# Patient Record
Sex: MALE | Race: WHITE | Employment: OTHER | URBAN - METROPOLITAN AREA
[De-identification: names, ages, dates, MRNs, and addresses within clinical notes are randomized per-mention and may not be internally consistent; named-entity substitution may affect disease eponyms.]

---

## 2017-01-09 ENCOUNTER — OFFICE VISIT (OUTPATIENT)
Dept: FAMILY MEDICINE CLINIC | Age: 42
End: 2017-01-09

## 2017-01-09 ENCOUNTER — TELEPHONE (OUTPATIENT)
Dept: FAMILY MEDICINE CLINIC | Age: 42
End: 2017-01-09

## 2017-01-09 VITALS
HEIGHT: 67 IN | TEMPERATURE: 98.2 F | RESPIRATION RATE: 14 BRPM | SYSTOLIC BLOOD PRESSURE: 132 MMHG | DIASTOLIC BLOOD PRESSURE: 84 MMHG | BODY MASS INDEX: 25.9 KG/M2 | HEART RATE: 74 BPM | WEIGHT: 165 LBS

## 2017-01-09 DIAGNOSIS — F31.12 BIPOLAR 1 DISORDER WITH MODERATE MANIA (HCC): ICD-10-CM

## 2017-01-09 DIAGNOSIS — F41.9 ANXIETY AND DEPRESSION: ICD-10-CM

## 2017-01-09 DIAGNOSIS — F32.A ANXIETY AND DEPRESSION: Primary | ICD-10-CM

## 2017-01-09 DIAGNOSIS — Z20.2 STD EXPOSURE: ICD-10-CM

## 2017-01-09 DIAGNOSIS — F32.A ANXIETY AND DEPRESSION: ICD-10-CM

## 2017-01-09 DIAGNOSIS — F41.9 ANXIETY AND DEPRESSION: Primary | ICD-10-CM

## 2017-01-09 LAB
ALBUMIN SERPL-MCNC: 4.6 G/DL (ref 3.9–4.9)
ALP BLD-CCNC: 69 U/L (ref 35–104)
ALT SERPL-CCNC: 29 U/L (ref 0–41)
ANION GAP SERPL CALCULATED.3IONS-SCNC: 12 MEQ/L (ref 7–13)
AST SERPL-CCNC: 32 U/L (ref 0–40)
BILIRUB SERPL-MCNC: 1.4 MG/DL (ref 0–1.2)
BUN BLDV-MCNC: 16 MG/DL (ref 6–20)
CALCIUM SERPL-MCNC: 9.7 MG/DL (ref 8.6–10.2)
CHLORIDE BLD-SCNC: 99 MEQ/L (ref 98–107)
CO2: 26 MEQ/L (ref 22–29)
CREAT SERPL-MCNC: 0.84 MG/DL (ref 0.7–1.2)
GFR AFRICAN AMERICAN: >60
GFR NON-AFRICAN AMERICAN: >60
GLOBULIN: 2.5 G/DL (ref 2.3–3.5)
GLUCOSE BLD-MCNC: 102 MG/DL (ref 74–109)
HAV IGM SER IA-ACNC: NORMAL
HCT VFR BLD CALC: 45.1 % (ref 42–52)
HEMOGLOBIN: 15.7 G/DL (ref 14–18)
HEPATITIS B CORE IGM ANTIBODY: NORMAL
HEPATITIS B SURFACE ANTIGEN INTERPRETATION: NORMAL
HEPATITIS C ANTIBODY INTERPRETATION: NORMAL
HEPATITIS INTERPRETATION:: NORMAL
MCH RBC QN AUTO: 32.1 PG (ref 27–31.3)
MCHC RBC AUTO-ENTMCNC: 34.8 % (ref 33–37)
MCV RBC AUTO: 92.1 FL (ref 80–100)
PDW BLD-RTO: 12.6 % (ref 11.5–14.5)
PLATELET # BLD: 210 K/UL (ref 130–400)
POTASSIUM SERPL-SCNC: 3.9 MEQ/L (ref 3.5–5.1)
RBC # BLD: 4.9 M/UL (ref 4.7–6.1)
SODIUM BLD-SCNC: 137 MEQ/L (ref 132–144)
T4 FREE: 1.28 NG/DL (ref 0.93–1.7)
TOTAL PROTEIN: 7.1 G/DL (ref 6.4–8.1)
TSH SERPL DL<=0.05 MIU/L-ACNC: 1.08 UIU/ML (ref 0.27–4.2)
WBC # BLD: 8.3 K/UL (ref 4.8–10.8)

## 2017-01-09 PROCEDURE — 99214 OFFICE O/P EST MOD 30 MIN: CPT | Performed by: FAMILY MEDICINE

## 2017-01-09 RX ORDER — LORAZEPAM 1 MG/1
1 TABLET ORAL EVERY 8 HOURS PRN
Qty: 15 TABLET | Refills: 1 | Status: SHIPPED | OUTPATIENT
Start: 2017-01-09 | End: 2017-02-08

## 2017-01-09 ASSESSMENT — PATIENT HEALTH QUESTIONNAIRE - PHQ9
SUM OF ALL RESPONSES TO PHQ9 QUESTIONS 1 & 2: 2
2. FEELING DOWN, DEPRESSED OR HOPELESS: 1
SUM OF ALL RESPONSES TO PHQ QUESTIONS 1-9: 2
1. LITTLE INTEREST OR PLEASURE IN DOING THINGS: 1

## 2017-01-10 LAB — RPR: NORMAL

## 2017-01-12 LAB
CHLAMYDIA TRACHOMATIS AMPLIFIED DET: NEGATIVE
HERPES TYPE 1/2 IGM COMBINED: 1 IV
HERPES TYPE I/II IGG COMBINED: >22.4 IV
HIV-1 AND HIV-2 ANTIBODIES: NEGATIVE
HSV 1 GLYCOPROTEIN G AB IGG: 39.4 IV
HSV 2 GLYCOPROTEIN G AB IGG: 4.36 IV
N GONORRHOEAE AMPLIFIED DET: NEGATIVE
SPECIMEN SOURCE: NORMAL

## 2017-01-13 RX ORDER — VALACYCLOVIR HYDROCHLORIDE 500 MG/1
500 TABLET, FILM COATED ORAL 2 TIMES DAILY
Qty: 10 TABLET | Refills: 0 | Status: SHIPPED | OUTPATIENT
Start: 2017-01-13 | End: 2017-01-18

## 2017-03-21 ENCOUNTER — OFFICE VISIT (OUTPATIENT)
Dept: FAMILY MEDICINE CLINIC | Age: 42
End: 2017-03-21

## 2017-03-21 VITALS
WEIGHT: 165 LBS | SYSTOLIC BLOOD PRESSURE: 130 MMHG | HEIGHT: 67 IN | BODY MASS INDEX: 25.9 KG/M2 | TEMPERATURE: 98.2 F | HEART RATE: 70 BPM | RESPIRATION RATE: 14 BRPM | DIASTOLIC BLOOD PRESSURE: 86 MMHG

## 2017-03-21 DIAGNOSIS — F41.9 ANXIETY AND DEPRESSION: ICD-10-CM

## 2017-03-21 DIAGNOSIS — F32.A ANXIETY AND DEPRESSION: ICD-10-CM

## 2017-03-21 DIAGNOSIS — F31.12 BIPOLAR 1 DISORDER WITH MODERATE MANIA (HCC): Primary | ICD-10-CM

## 2017-03-21 PROCEDURE — G8427 DOCREV CUR MEDS BY ELIG CLIN: HCPCS | Performed by: FAMILY MEDICINE

## 2017-03-21 PROCEDURE — 1036F TOBACCO NON-USER: CPT | Performed by: FAMILY MEDICINE

## 2017-03-21 PROCEDURE — G8419 CALC BMI OUT NRM PARAM NOF/U: HCPCS | Performed by: FAMILY MEDICINE

## 2017-03-21 PROCEDURE — 99213 OFFICE O/P EST LOW 20 MIN: CPT | Performed by: FAMILY MEDICINE

## 2017-03-21 PROCEDURE — G8484 FLU IMMUNIZE NO ADMIN: HCPCS | Performed by: FAMILY MEDICINE

## 2017-03-21 RX ORDER — QUETIAPINE FUMARATE 50 MG/1
50 TABLET, EXTENDED RELEASE ORAL NIGHTLY
Qty: 30 TABLET | Refills: 1 | Status: SHIPPED | OUTPATIENT
Start: 2017-03-21 | End: 2017-04-26

## 2017-03-21 RX ORDER — QUETIAPINE FUMARATE 50 MG/1
50 TABLET, EXTENDED RELEASE ORAL NIGHTLY
Qty: 60 TABLET | Refills: 1 | Status: SHIPPED | OUTPATIENT
Start: 2017-03-21 | End: 2017-03-21 | Stop reason: SDUPTHER

## 2017-04-26 ENCOUNTER — OFFICE VISIT (OUTPATIENT)
Dept: FAMILY MEDICINE CLINIC | Age: 42
End: 2017-04-26

## 2017-04-26 VITALS
RESPIRATION RATE: 14 BRPM | BODY MASS INDEX: 25.27 KG/M2 | DIASTOLIC BLOOD PRESSURE: 82 MMHG | WEIGHT: 161 LBS | TEMPERATURE: 97.8 F | HEIGHT: 67 IN | HEART RATE: 72 BPM | SYSTOLIC BLOOD PRESSURE: 124 MMHG

## 2017-04-26 DIAGNOSIS — F41.9 ANXIETY AND DEPRESSION: ICD-10-CM

## 2017-04-26 DIAGNOSIS — F32.A ANXIETY AND DEPRESSION: ICD-10-CM

## 2017-04-26 DIAGNOSIS — A60.00 GENITAL HERPES SIMPLEX, UNSPECIFIED SITE: ICD-10-CM

## 2017-04-26 DIAGNOSIS — F31.12 BIPOLAR 1 DISORDER WITH MODERATE MANIA (HCC): Primary | ICD-10-CM

## 2017-04-26 PROCEDURE — G8427 DOCREV CUR MEDS BY ELIG CLIN: HCPCS | Performed by: FAMILY MEDICINE

## 2017-04-26 PROCEDURE — G8419 CALC BMI OUT NRM PARAM NOF/U: HCPCS | Performed by: FAMILY MEDICINE

## 2017-04-26 PROCEDURE — 1036F TOBACCO NON-USER: CPT | Performed by: FAMILY MEDICINE

## 2017-04-26 PROCEDURE — 99213 OFFICE O/P EST LOW 20 MIN: CPT | Performed by: FAMILY MEDICINE

## 2017-04-26 RX ORDER — VALACYCLOVIR HYDROCHLORIDE 500 MG/1
500 TABLET, FILM COATED ORAL DAILY
Qty: 30 TABLET | Refills: 3 | Status: SHIPPED | OUTPATIENT
Start: 2017-04-26

## 2017-09-24 ENCOUNTER — E-VISIT (OUTPATIENT)
Dept: FAMILY MEDICINE CLINIC | Age: 42
End: 2017-09-24

## 2017-09-24 DIAGNOSIS — R30.0 DYSURIA: Primary | ICD-10-CM

## 2017-09-24 PROCEDURE — 99444 PR PHYSICIAN ONLINE EVALUATION & MANAGEMENT SERVICE: CPT | Performed by: FAMILY MEDICINE

## 2017-09-26 ENCOUNTER — NURSE ONLY (OUTPATIENT)
Dept: FAMILY MEDICINE CLINIC | Age: 42
End: 2017-09-26

## 2017-09-26 DIAGNOSIS — R30.0 DYSURIA: Primary | ICD-10-CM

## 2017-09-26 DIAGNOSIS — R30.0 DYSURIA: ICD-10-CM

## 2017-09-26 LAB
BILIRUBIN, POC: NORMAL
BLOOD URINE, POC: NORMAL
CLARITY, POC: NORMAL
COLOR, POC: NORMAL
GLUCOSE URINE, POC: NORMAL
KETONES, POC: NORMAL
LEUKOCYTE EST, POC: NORMAL
NITRITE, POC: NORMAL
PH, POC: 6
PROTEIN, POC: NORMAL
SPECIFIC GRAVITY, POC: 1.02
UROBILINOGEN, POC: NORMAL

## 2017-09-28 LAB — URINE CULTURE, ROUTINE: NORMAL

## 2017-10-02 LAB
C. TRACHOMATIS DNA ,URINE: NEGATIVE
N. GONORRHOEAE DNA, URINE: NEGATIVE

## 2018-01-31 ENCOUNTER — TELEPHONE (OUTPATIENT)
Dept: FAMILY MEDICINE CLINIC | Age: 43
End: 2018-01-31

## 2018-01-31 NOTE — TELEPHONE ENCOUNTER
Patients wife called, her  Destiny Macias was tld by physical therapy that it would be beneficial for him to get a script of Dexamethazone for the patients feet. Physical therapy would use this on the patient.  Please advice

## 2018-02-06 ENCOUNTER — TELEPHONE (OUTPATIENT)
Dept: FAMILY MEDICINE CLINIC | Age: 43
End: 2018-02-06

## 2018-02-22 NOTE — TELEPHONE ENCOUNTER
Wife called back and gave me the # to Maria Luisa Alvarez who is the Therapist that is recommending this her # is [phone]. Called # and left her a message to call me back.

## 2018-02-23 ENCOUNTER — TELEPHONE (OUTPATIENT)
Dept: FAMILY MEDICINE CLINIC | Age: 43
End: 2018-02-23

## 2018-10-15 ENCOUNTER — OFFICE VISIT (OUTPATIENT)
Dept: FAMILY MEDICINE CLINIC | Age: 43
End: 2018-10-15
Payer: COMMERCIAL

## 2018-10-15 VITALS
BODY MASS INDEX: 25.96 KG/M2 | HEIGHT: 67 IN | TEMPERATURE: 97.5 F | SYSTOLIC BLOOD PRESSURE: 130 MMHG | RESPIRATION RATE: 14 BRPM | OXYGEN SATURATION: 98 % | HEART RATE: 45 BPM | WEIGHT: 165.4 LBS | DIASTOLIC BLOOD PRESSURE: 88 MMHG

## 2018-10-15 DIAGNOSIS — F31.12 BIPOLAR 1 DISORDER WITH MODERATE MANIA (HCC): ICD-10-CM

## 2018-10-15 DIAGNOSIS — R03.0 ELEVATED BLOOD PRESSURE READING: Primary | ICD-10-CM

## 2018-10-15 PROCEDURE — G8484 FLU IMMUNIZE NO ADMIN: HCPCS | Performed by: FAMILY MEDICINE

## 2018-10-15 PROCEDURE — 99213 OFFICE O/P EST LOW 20 MIN: CPT | Performed by: FAMILY MEDICINE

## 2018-10-15 PROCEDURE — 93000 ELECTROCARDIOGRAM COMPLETE: CPT | Performed by: FAMILY MEDICINE

## 2018-10-15 PROCEDURE — G8419 CALC BMI OUT NRM PARAM NOF/U: HCPCS | Performed by: FAMILY MEDICINE

## 2018-10-15 PROCEDURE — G8427 DOCREV CUR MEDS BY ELIG CLIN: HCPCS | Performed by: FAMILY MEDICINE

## 2018-10-15 PROCEDURE — 1036F TOBACCO NON-USER: CPT | Performed by: FAMILY MEDICINE

## 2018-10-15 ASSESSMENT — PATIENT HEALTH QUESTIONNAIRE - PHQ9
SUM OF ALL RESPONSES TO PHQ QUESTIONS 1-9: 0
SUM OF ALL RESPONSES TO PHQ QUESTIONS 1-9: 0
2. FEELING DOWN, DEPRESSED OR HOPELESS: 0
SUM OF ALL RESPONSES TO PHQ9 QUESTIONS 1 & 2: 0
1. LITTLE INTEREST OR PLEASURE IN DOING THINGS: 0

## 2018-10-15 ASSESSMENT — ENCOUNTER SYMPTOMS: SHORTNESS OF BREATH: 0

## 2018-10-15 NOTE — PROGRESS NOTES
Subjective:      Patient ID: Mireya Lincoln is a 37 y.o. male    HPI  Here with acute visit. Routinely seen by [facility]   Has noted bp elevated readings this month. Had it taken in several places recently with elevated readings noted. Runs around 50 miles per week and does run marathons periodically. Does drink about 6-8  cups of coffee per day. Review of Systems   Constitutional: Negative for activity change, appetite change and unexpected weight change. Respiratory: Negative for shortness of breath. Neurological: Negative for syncope. Reviewed allergy, medical, social, surgical, family and med list changes and updated   Files  Social History     Social History    Marital status:      Spouse name: N/A    Number of children: N/A    Years of education: N/A     Social History Main Topics    Smoking status: Former Smoker     Packs/day: 2.00     Years: 15.00     Types: Cigarettes     Quit date: 10/15/2005    Smokeless tobacco: Never Used    Alcohol use Yes      Comment: occasional    Drug use: No    Sexual activity: Not Asked     Other Topics Concern    None     Social History Narrative    None     Current Outpatient Prescriptions   Medication Sig Dispense Refill    valACYclovir (VALTREX) 500 MG tablet Take 1 tablet by mouth daily 30 tablet 3    EPINEPHrine (EPIPEN) 0.3 MG/0.3ML JANETH injection Inject 0.3 mLs into the muscle once as needed (reaction) 1 Device 0     No current facility-administered medications for this visit. Family History   Problem Relation Age of Onset    Cancer Father         PANCREATIC     Past Medical History:   Diagnosis Date    Anxiety and depression      Objective:   /88   Pulse (!) 45   Temp 97.5 °F (36.4 °C)   Resp 14   Ht 5' 7\" (1.702 m)   Wt 165 lb 6.4 oz (75 kg)   SpO2 98%   BMI 25.91 kg/m²     Physical Exam  Neck:no carotid bruits. No masses. No adenopathy. No thyroid asymmetry. Lungs:clear and equal breath sounds.   No

## 2018-10-26 DIAGNOSIS — R03.0 ELEVATED BLOOD PRESSURE READING: ICD-10-CM

## 2018-10-26 LAB
ALBUMIN SERPL-MCNC: 4.8 G/DL (ref 3.9–4.9)
ALP BLD-CCNC: 76 U/L (ref 35–104)
ALT SERPL-CCNC: 29 U/L (ref 0–41)
ANION GAP SERPL CALCULATED.3IONS-SCNC: 12 MEQ/L (ref 7–13)
AST SERPL-CCNC: 28 U/L (ref 0–40)
BASOPHILS ABSOLUTE: 0 K/UL (ref 0–0.2)
BASOPHILS RELATIVE PERCENT: 0.5 %
BILIRUB SERPL-MCNC: 1.2 MG/DL (ref 0–1.2)
BUN BLDV-MCNC: 15 MG/DL (ref 6–20)
CALCIUM SERPL-MCNC: 9.5 MG/DL (ref 8.6–10.2)
CHLORIDE BLD-SCNC: 102 MEQ/L (ref 98–107)
CHOLESTEROL, TOTAL: 156 MG/DL (ref 0–199)
CO2: 28 MEQ/L (ref 22–29)
CREAT SERPL-MCNC: 0.73 MG/DL (ref 0.7–1.2)
EOSINOPHILS ABSOLUTE: 0.1 K/UL (ref 0–0.7)
EOSINOPHILS RELATIVE PERCENT: 1.7 %
GFR AFRICAN AMERICAN: >60
GFR NON-AFRICAN AMERICAN: >60
GLOBULIN: 2.6 G/DL (ref 2.3–3.5)
GLUCOSE BLD-MCNC: 83 MG/DL (ref 74–109)
HCT VFR BLD CALC: 43 % (ref 42–52)
HDLC SERPL-MCNC: 71 MG/DL (ref 40–59)
HEMOGLOBIN: 14.9 G/DL (ref 14–18)
LDL CHOLESTEROL CALCULATED: 68 MG/DL (ref 0–129)
LYMPHOCYTES ABSOLUTE: 1.5 K/UL (ref 1–4.8)
LYMPHOCYTES RELATIVE PERCENT: 30.3 %
MCH RBC QN AUTO: 32.5 PG (ref 27–31.3)
MCHC RBC AUTO-ENTMCNC: 34.7 % (ref 33–37)
MCV RBC AUTO: 93.6 FL (ref 80–100)
MONOCYTES ABSOLUTE: 0.4 K/UL (ref 0.2–0.8)
MONOCYTES RELATIVE PERCENT: 8.7 %
NEUTROPHILS ABSOLUTE: 2.9 K/UL (ref 1.4–6.5)
NEUTROPHILS RELATIVE PERCENT: 58.8 %
PDW BLD-RTO: 12.3 % (ref 11.5–14.5)
PLATELET # BLD: 198 K/UL (ref 130–400)
POTASSIUM SERPL-SCNC: 4.6 MEQ/L (ref 3.5–5.1)
RBC # BLD: 4.59 M/UL (ref 4.7–6.1)
SODIUM BLD-SCNC: 142 MEQ/L (ref 132–144)
TOTAL PROTEIN: 7.4 G/DL (ref 6.4–8.1)
TRIGL SERPL-MCNC: 84 MG/DL (ref 0–200)
WBC # BLD: 4.9 K/UL (ref 4.8–10.8)

## 2018-10-31 ENCOUNTER — OFFICE VISIT (OUTPATIENT)
Dept: FAMILY MEDICINE CLINIC | Age: 43
End: 2018-10-31
Payer: COMMERCIAL

## 2018-10-31 VITALS
BODY MASS INDEX: 25.11 KG/M2 | HEART RATE: 72 BPM | SYSTOLIC BLOOD PRESSURE: 136 MMHG | TEMPERATURE: 98 F | DIASTOLIC BLOOD PRESSURE: 78 MMHG | WEIGHT: 160 LBS | RESPIRATION RATE: 16 BRPM | HEIGHT: 67 IN

## 2018-10-31 DIAGNOSIS — L72.0 EPIDERMAL CYST: ICD-10-CM

## 2018-10-31 DIAGNOSIS — F31.12 BIPOLAR 1 DISORDER WITH MODERATE MANIA (HCC): ICD-10-CM

## 2018-10-31 DIAGNOSIS — F41.9 ANXIETY AND DEPRESSION: Primary | ICD-10-CM

## 2018-10-31 DIAGNOSIS — R03.0 ELEVATED BLOOD PRESSURE READING: ICD-10-CM

## 2018-10-31 DIAGNOSIS — Z23 NEED FOR INFLUENZA VACCINATION: ICD-10-CM

## 2018-10-31 DIAGNOSIS — F32.A ANXIETY AND DEPRESSION: Primary | ICD-10-CM

## 2018-10-31 PROCEDURE — G8427 DOCREV CUR MEDS BY ELIG CLIN: HCPCS | Performed by: FAMILY MEDICINE

## 2018-10-31 PROCEDURE — 90688 IIV4 VACCINE SPLT 0.5 ML IM: CPT | Performed by: FAMILY MEDICINE

## 2018-10-31 PROCEDURE — G8482 FLU IMMUNIZE ORDER/ADMIN: HCPCS | Performed by: FAMILY MEDICINE

## 2018-10-31 PROCEDURE — G8419 CALC BMI OUT NRM PARAM NOF/U: HCPCS | Performed by: FAMILY MEDICINE

## 2018-10-31 PROCEDURE — 99214 OFFICE O/P EST MOD 30 MIN: CPT | Performed by: FAMILY MEDICINE

## 2018-10-31 PROCEDURE — 90471 IMMUNIZATION ADMIN: CPT | Performed by: FAMILY MEDICINE

## 2018-10-31 PROCEDURE — 1036F TOBACCO NON-USER: CPT | Performed by: FAMILY MEDICINE

## 2019-02-01 ENCOUNTER — OFFICE VISIT (OUTPATIENT)
Dept: FAMILY MEDICINE CLINIC | Age: 44
End: 2019-02-01
Payer: COMMERCIAL

## 2019-02-01 VITALS
BODY MASS INDEX: 25.06 KG/M2 | RESPIRATION RATE: 16 BRPM | TEMPERATURE: 97.9 F | HEART RATE: 76 BPM | DIASTOLIC BLOOD PRESSURE: 88 MMHG | SYSTOLIC BLOOD PRESSURE: 132 MMHG | HEIGHT: 67 IN

## 2019-02-01 DIAGNOSIS — R20.8 OTHER DISTURBANCES OF SKIN SENSATION: ICD-10-CM

## 2019-02-01 DIAGNOSIS — L72.0 EPIDERMAL CYST OF EAR: Primary | ICD-10-CM

## 2019-02-01 DIAGNOSIS — R51.9 NONINTRACTABLE HEADACHE, UNSPECIFIED CHRONICITY PATTERN, UNSPECIFIED HEADACHE TYPE: ICD-10-CM

## 2019-02-01 DIAGNOSIS — L98.9 SKIN LESION: ICD-10-CM

## 2019-02-01 DIAGNOSIS — L72.0 EPIDERMAL CYST OF EAR: ICD-10-CM

## 2019-02-01 PROCEDURE — G8427 DOCREV CUR MEDS BY ELIG CLIN: HCPCS | Performed by: FAMILY MEDICINE

## 2019-02-01 PROCEDURE — 11102 TANGNTL BX SKIN SINGLE LES: CPT | Performed by: FAMILY MEDICINE

## 2019-02-01 PROCEDURE — G8482 FLU IMMUNIZE ORDER/ADMIN: HCPCS | Performed by: FAMILY MEDICINE

## 2019-02-01 PROCEDURE — 99213 OFFICE O/P EST LOW 20 MIN: CPT | Performed by: FAMILY MEDICINE

## 2019-02-01 PROCEDURE — 1036F TOBACCO NON-USER: CPT | Performed by: FAMILY MEDICINE

## 2019-02-01 PROCEDURE — G8419 CALC BMI OUT NRM PARAM NOF/U: HCPCS | Performed by: FAMILY MEDICINE

## 2019-02-01 RX ORDER — LISINOPRIL 5 MG/1
5 TABLET ORAL DAILY
Qty: 90 TABLET | Refills: 1 | Status: SHIPPED | OUTPATIENT
Start: 2019-02-01

## 2019-03-01 DIAGNOSIS — I10 ESSENTIAL HYPERTENSION: ICD-10-CM

## 2019-03-01 DIAGNOSIS — I10 ESSENTIAL HYPERTENSION: Primary | ICD-10-CM

## 2019-03-01 LAB
ANION GAP SERPL CALCULATED.3IONS-SCNC: 16 MEQ/L (ref 9–15)
BUN BLDV-MCNC: 18 MG/DL (ref 6–20)
CALCIUM SERPL-MCNC: 9.6 MG/DL (ref 8.5–9.9)
CHLORIDE BLD-SCNC: 102 MEQ/L (ref 95–107)
CO2: 27 MEQ/L (ref 20–31)
CREAT SERPL-MCNC: 0.59 MG/DL (ref 0.7–1.2)
GFR AFRICAN AMERICAN: >60
GFR NON-AFRICAN AMERICAN: >60
GLUCOSE BLD-MCNC: 78 MG/DL (ref 70–99)
POTASSIUM SERPL-SCNC: 4.2 MEQ/L (ref 3.4–4.9)
SODIUM BLD-SCNC: 145 MEQ/L (ref 135–144)

## 2019-05-24 ENCOUNTER — TELEPHONE (OUTPATIENT)
Dept: ADMINISTRATIVE | Age: 44
End: 2019-05-24

## 2019-06-19 ENCOUNTER — HOSPITAL ENCOUNTER (EMERGENCY)
Age: 44
Discharge: HOME | End: 2019-06-19
Attending: EMERGENCY MEDICINE
Payer: COMMERCIAL

## 2019-06-19 VITALS
HEART RATE: 78 BPM | SYSTOLIC BLOOD PRESSURE: 147 MMHG | TEMPERATURE: 97.8 F | RESPIRATION RATE: 18 BRPM | BODY MASS INDEX: 25.06 KG/M2 | DIASTOLIC BLOOD PRESSURE: 72 MMHG | WEIGHT: 160 LBS | OXYGEN SATURATION: 97 %

## 2019-06-19 DIAGNOSIS — S61.411A LACERATION OF RIGHT HAND WITHOUT FOREIGN BODY, INITIAL ENCOUNTER: Primary | ICD-10-CM

## 2019-06-19 PROCEDURE — 90715 TDAP VACCINE 7 YRS/> IM: CPT | Performed by: EMERGENCY MEDICINE

## 2019-06-19 PROCEDURE — 6370000000 HC RX 637 (ALT 250 FOR IP): Performed by: EMERGENCY MEDICINE

## 2019-06-19 PROCEDURE — 2500000003 HC RX 250 WO HCPCS: Performed by: EMERGENCY MEDICINE

## 2019-06-19 PROCEDURE — 90471 IMMUNIZATION ADMIN: CPT | Performed by: EMERGENCY MEDICINE

## 2019-06-19 PROCEDURE — 6360000002 HC RX W HCPCS: Performed by: EMERGENCY MEDICINE

## 2019-06-19 PROCEDURE — 99282 EMERGENCY DEPT VISIT SF MDM: CPT

## 2019-06-19 PROCEDURE — 12002 RPR S/N/AX/GEN/TRNK2.6-7.5CM: CPT

## 2019-06-19 RX ORDER — HYDROCODONE BITARTRATE AND ACETAMINOPHEN 5; 325 MG/1; MG/1
1 TABLET ORAL EVERY 6 HOURS PRN
Qty: 10 TABLET | Refills: 0 | Status: SHIPPED | OUTPATIENT
Start: 2019-06-19 | End: 2019-06-22

## 2019-06-19 RX ORDER — ONDANSETRON 4 MG/1
4 TABLET, ORALLY DISINTEGRATING ORAL ONCE
Status: COMPLETED | OUTPATIENT
Start: 2019-06-19 | End: 2019-06-19

## 2019-06-19 RX ORDER — DIAPER,BRIEF,INFANT-TODD,DISP
EACH MISCELLANEOUS 2 TIMES DAILY
Status: DISCONTINUED | OUTPATIENT
Start: 2019-06-19 | End: 2019-06-19 | Stop reason: HOSPADM

## 2019-06-19 RX ORDER — LIDOCAINE HYDROCHLORIDE 10 MG/ML
5 INJECTION, SOLUTION INFILTRATION; PERINEURAL ONCE
Status: COMPLETED | OUTPATIENT
Start: 2019-06-19 | End: 2019-06-19

## 2019-06-19 RX ADMIN — ONDANSETRON 4 MG: 4 TABLET, ORALLY DISINTEGRATING ORAL at 09:35

## 2019-06-19 RX ADMIN — TETANUS TOXOID, REDUCED DIPHTHERIA TOXOID AND ACELLULAR PERTUSSIS VACCINE, ADSORBED 0.5 ML: 5; 2.5; 8; 8; 2.5 SUSPENSION INTRAMUSCULAR at 10:19

## 2019-06-19 RX ADMIN — BACITRACIN: 500 OINTMENT TOPICAL at 10:19

## 2019-06-19 RX ADMIN — LIDOCAINE HYDROCHLORIDE 5 ML: 10 INJECTION, SOLUTION INFILTRATION; PERINEURAL at 10:18

## 2019-06-19 ASSESSMENT — ENCOUNTER SYMPTOMS
VOMITING: 0
SINUS PRESSURE: 0
BACK PAIN: 0
CHEST TIGHTNESS: 0
DIARRHEA: 0
STRIDOR: 0
BLOOD IN STOOL: 0
WHEEZING: 0
VOICE CHANGE: 0
CHOKING: 0
FACIAL SWELLING: 0
CONSTIPATION: 0
EYE REDNESS: 0
EYE DISCHARGE: 0
EYE PAIN: 0
ABDOMINAL PAIN: 0
COUGH: 0
SHORTNESS OF BREATH: 0
TROUBLE SWALLOWING: 0
SORE THROAT: 0

## 2019-06-19 ASSESSMENT — PAIN SCALES - GENERAL: PAINLEVEL_OUTOF10: 2

## 2019-06-19 NOTE — ED TRIAGE NOTES
Patient was cleaning gutters when his right hand scrapped the metal on the gutter and he has laceration to tops of right knuckles.

## 2019-06-19 NOTE — ED PROVIDER NOTES
[facility] ED  eMERGENCY dEPARTMENT eNCOUnter      Pt Name: Pillo Oseguera  MRN: [id number]  [place] 1975  Date of evaluation: 6/19/2019  Provider: MD Negar Alexandre       Chief Complaint   Patient presents with    Laceration     right hand         HISTORY OF PRESENT ILLNESS   (Location/Symptom, Timing/Onset,Context/Setting, Quality, Duration, Modifying Factors, Severity)  Note limiting factors. Pillo Oseguera is a 37 y.o. male who presents to the emergency department patient ascending cut his right hand history of anxiety and bipolar disorder not sure of last tetanus immunization and has no numbness tingling to the fingertips bleeding controlled patient is right-hand dominant    HPI    NursingNotes were reviewed. REVIEW OF SYSTEMS    (2-9 systems for level 4, 10 or more for level 5)     Review of Systems   Constitutional: Negative. Negative for activity change and fever. HENT: Negative for congestion, drooling, facial swelling, mouth sores, nosebleeds, sinus pressure, sore throat, trouble swallowing and voice change. Eyes: Negative for pain, discharge, redness and visual disturbance. Respiratory: Negative for cough, choking, chest tightness, shortness of breath, wheezing and stridor. Cardiovascular: Negative for chest pain, palpitations and leg swelling. Gastrointestinal: Negative for abdominal pain, blood in stool, constipation, diarrhea and vomiting. Endocrine: Negative for cold intolerance, polyphagia and polyuria. Genitourinary: Negative for dysuria, flank pain, frequency, genital sores and urgency. Musculoskeletal: Negative for back pain, joint swelling, neck pain and neck stiffness. Skin: Positive for wound. Negative for pallor and rash. Neurological: Negative for tremors, seizures, syncope, weakness, numbness and headaches. Hematological: Negative for adenopathy. Does not bruise/bleed easily.    Psychiatric/Behavioral: Negative for agitation, interpreted by the Emergency Department Physician who either signs or Co-signsthis chart in the absence of a cardiologist.        RADIOLOGY:   Lacie Rocky such as CT, Ultrasound and MRI are read by the radiologist. Plain radiographic images are visualized and preliminarily interpreted by the emergency physician with the below findings:        Interpretation per the Radiologist below, if available at the time ofthis note:    No orders to display         ED BEDSIDE ULTRASOUND:   Performed by ED Physician - none    LABS:  Labs Reviewed - No data to display    All other labs were within normal range or not returned as of this dictation. EMERGENCY DEPARTMENT COURSE and DIFFERENTIAL DIAGNOSIS/MDM:   Vitals:    Vitals:    06/19/19 0930   BP: (!) 147/72   Pulse: 78   Resp: 18   Temp: 97.8 °F (36.6 °C)   TempSrc: Oral   SpO2: 97%   Weight: 160 lb (72.6 kg)           MDM    CRITICAL CARE TIME   Total Critical Care time was  minutes, excluding separately reportableprocedures. There was a high probability of clinicallysignificant/life threatening deterioration in the patient's condition which required my urgent intervention. ONSULTS:  None    PROCEDURES:  Unless otherwise noted below, none     Lac Repair  Date/Time: 6/19/2019 10:34 AM  Performed by: Betty English MD  Authorized by: Betty English MD     Consent:     Consent obtained:  Verbal    Consent given by:  Patient    Risks discussed:  Infection, poor cosmetic result and poor wound healing  Anesthesia (see MAR for exact dosages):      Anesthesia method:  Local infiltration    Local anesthetic:  Lidocaine 1% w/o epi  Laceration details:     Location:  Hand    Hand location:  R hand, dorsum    Length (cm):  3    Depth (mm):  0.2  Pre-procedure details:     Preparation:  Patient was prepped and draped in usual sterile fashion  Exploration:     Hemostasis achieved with:  Direct pressure    Wound exploration: wound explored through full range of motion

## 2019-06-19 NOTE — ED NOTES
Quarter size laceration between the right index finger and middle finger, soaking in dynahex at this time. Wound well approximated.      Sharifa Bynum RN  06/19/19 [id number]

## 2019-06-19 NOTE — ED NOTES
DSD applied to right hand. MSP intact before and after application. Patient given extra dressing supplies.       Davis Grande RN  06/19/19 [id number]

## 2021-08-25 ENCOUNTER — OFFICE VISIT (OUTPATIENT)
Dept: FAMILY MEDICINE CLINIC | Age: 46
End: 2021-08-25
Payer: COMMERCIAL

## 2021-08-25 ENCOUNTER — APPOINTMENT (OUTPATIENT)
Dept: CT IMAGING | Age: 46
End: 2021-08-25
Payer: COMMERCIAL

## 2021-08-25 ENCOUNTER — HOSPITAL ENCOUNTER (EMERGENCY)
Age: 46
Discharge: HOME | End: 2021-08-25
Attending: EMERGENCY MEDICINE
Payer: COMMERCIAL

## 2021-08-25 VITALS
TEMPERATURE: 97.6 F | OXYGEN SATURATION: 96 % | SYSTOLIC BLOOD PRESSURE: 138 MMHG | BODY MASS INDEX: 25.43 KG/M2 | HEART RATE: 62 BPM | DIASTOLIC BLOOD PRESSURE: 82 MMHG | HEIGHT: 67 IN | WEIGHT: 162 LBS

## 2021-08-25 VITALS
BODY MASS INDEX: 25.43 KG/M2 | WEIGHT: 162 LBS | TEMPERATURE: 98.3 F | HEART RATE: 75 BPM | HEIGHT: 67 IN | SYSTOLIC BLOOD PRESSURE: 138 MMHG | RESPIRATION RATE: 20 BRPM | DIASTOLIC BLOOD PRESSURE: 79 MMHG | OXYGEN SATURATION: 98 %

## 2021-08-25 DIAGNOSIS — R10.9 FLANK PAIN: Primary | ICD-10-CM

## 2021-08-25 DIAGNOSIS — N20.0 NEPHROLITHIASIS: ICD-10-CM

## 2021-08-25 DIAGNOSIS — R91.8 LUNG NODULES: ICD-10-CM

## 2021-08-25 DIAGNOSIS — I10 ESSENTIAL HYPERTENSION: ICD-10-CM

## 2021-08-25 DIAGNOSIS — N20.0 KIDNEY STONE: ICD-10-CM

## 2021-08-25 LAB
ALBUMIN SERPL-MCNC: 4.6 G/DL (ref 3.5–4.6)
ALP BLD-CCNC: 78 U/L (ref 35–104)
ALT SERPL-CCNC: 24 U/L (ref 0–41)
AMYLASE: 55 U/L (ref 22–93)
ANION GAP SERPL CALCULATED.3IONS-SCNC: 12 MEQ/L (ref 9–15)
AST SERPL-CCNC: 26 U/L (ref 0–40)
BACTERIA: ABNORMAL /HPF
BASOPHILS ABSOLUTE: 0 K/UL (ref 0–0.1)
BASOPHILS RELATIVE PERCENT: 0.3 % (ref 0.2–1.2)
BILIRUB SERPL-MCNC: 1.3 MG/DL (ref 0.2–0.7)
BILIRUBIN URINE: NEGATIVE
BLOOD, URINE: NORMAL
BUN BLDV-MCNC: 13 MG/DL (ref 6–20)
CALCIUM SERPL-MCNC: 10.1 MG/DL (ref 8.5–9.9)
CHLORIDE BLD-SCNC: 99 MEQ/L (ref 95–107)
CLARITY: CLEAR
CO2: 26 MEQ/L (ref 20–31)
COLOR: YELLOW
CREAT SERPL-MCNC: 0.84 MG/DL (ref 0.7–1.2)
EOSINOPHILS ABSOLUTE: 0 K/UL (ref 0–0.5)
EOSINOPHILS RELATIVE PERCENT: 0.4 % (ref 0.8–7)
EPITHELIAL CELLS, UA: ABNORMAL /HPF
GFR AFRICAN AMERICAN: >60
GFR NON-AFRICAN AMERICAN: >60
GLOBULIN: 3.4 G/DL (ref 2.3–3.5)
GLUCOSE BLD-MCNC: 160 MG/DL (ref 70–99)
GLUCOSE URINE: NEGATIVE MG/DL
HCT VFR BLD CALC: 43 % (ref 42–52)
HEMOGLOBIN: 15 G/DL (ref 13.7–17.5)
IMMATURE GRANULOCYTES #: 0 K/UL
IMMATURE GRANULOCYTES %: 0.3 %
KETONES, URINE: NEGATIVE MG/DL
LEUKOCYTE ESTERASE, URINE: NEGATIVE
LIPASE: 21 U/L (ref 12–95)
LYMPHOCYTES ABSOLUTE: 1.4 K/UL (ref 1.3–3.6)
LYMPHOCYTES RELATIVE PERCENT: 18.3 %
MCH RBC QN AUTO: 33 PG (ref 25.7–32.2)
MCHC RBC AUTO-ENTMCNC: 34.9 % (ref 32.3–36.5)
MCV RBC AUTO: 94.5 FL (ref 79–92.2)
MONOCYTES ABSOLUTE: 0.6 K/UL (ref 0.3–0.8)
MONOCYTES RELATIVE PERCENT: 8.1 % (ref 5.3–12.2)
NEUTROPHILS ABSOLUTE: 5.7 K/UL (ref 1.8–5.4)
NEUTROPHILS RELATIVE PERCENT: 72.6 % (ref 34–67.9)
NITRITE, URINE: NEGATIVE
PDW BLD-RTO: 11.2 % (ref 11.6–14.4)
PH UA: 6 (ref 5–9)
PLATELET # BLD: 193 K/UL (ref 163–337)
POTASSIUM SERPL-SCNC: 4.4 MEQ/L (ref 3.4–4.9)
PROTEIN UA: NEGATIVE MG/DL
RBC # BLD: 4.55 M/UL (ref 4.63–6.08)
RBC UA: ABNORMAL /HPF (ref 0–2)
SODIUM BLD-SCNC: 137 MEQ/L (ref 135–144)
SPECIFIC GRAVITY UA: <=1.005 (ref 1–1.03)
TOTAL PROTEIN: 8 G/DL (ref 6.3–8)
URINE REFLEX TO CULTURE: NORMAL
UROBILINOGEN, URINE: 0.2 E.U./DL
WBC # BLD: 7.8 K/UL (ref 4.2–9)
WBC UA: ABNORMAL /HPF (ref 0–5)

## 2021-08-25 PROCEDURE — 96374 THER/PROPH/DIAG INJ IV PUSH: CPT

## 2021-08-25 PROCEDURE — G8427 DOCREV CUR MEDS BY ELIG CLIN: HCPCS | Performed by: PHYSICIAN ASSISTANT

## 2021-08-25 PROCEDURE — 82150 ASSAY OF AMYLASE: CPT

## 2021-08-25 PROCEDURE — 1036F TOBACCO NON-USER: CPT | Performed by: PHYSICIAN ASSISTANT

## 2021-08-25 PROCEDURE — 2580000003 HC RX 258: Performed by: EMERGENCY MEDICINE

## 2021-08-25 PROCEDURE — 80053 COMPREHEN METABOLIC PANEL: CPT

## 2021-08-25 PROCEDURE — 96375 TX/PRO/DX INJ NEW DRUG ADDON: CPT

## 2021-08-25 PROCEDURE — 99285 EMERGENCY DEPT VISIT HI MDM: CPT

## 2021-08-25 PROCEDURE — 36415 COLL VENOUS BLD VENIPUNCTURE: CPT

## 2021-08-25 PROCEDURE — 2500000003 HC RX 250 WO HCPCS: Performed by: EMERGENCY MEDICINE

## 2021-08-25 PROCEDURE — 99213 OFFICE O/P EST LOW 20 MIN: CPT | Performed by: PHYSICIAN ASSISTANT

## 2021-08-25 PROCEDURE — 81001 URINALYSIS AUTO W/SCOPE: CPT

## 2021-08-25 PROCEDURE — 81003 URINALYSIS AUTO W/O SCOPE: CPT | Performed by: PHYSICIAN ASSISTANT

## 2021-08-25 PROCEDURE — 83690 ASSAY OF LIPASE: CPT

## 2021-08-25 PROCEDURE — 74176 CT ABD & PELVIS W/O CONTRAST: CPT

## 2021-08-25 PROCEDURE — 85025 COMPLETE CBC W/AUTO DIFF WBC: CPT

## 2021-08-25 PROCEDURE — G8419 CALC BMI OUT NRM PARAM NOF/U: HCPCS | Performed by: PHYSICIAN ASSISTANT

## 2021-08-25 PROCEDURE — 6360000002 HC RX W HCPCS: Performed by: EMERGENCY MEDICINE

## 2021-08-25 RX ORDER — HYDROCODONE BITARTRATE AND ACETAMINOPHEN 5; 325 MG/1; MG/1
1 TABLET ORAL EVERY 6 HOURS PRN
Qty: 12 TABLET | Refills: 0 | Status: SHIPPED | OUTPATIENT
Start: 2021-08-25 | End: 2021-08-29

## 2021-08-25 RX ORDER — CIPROFLOXACIN 500 MG/1
500 TABLET, FILM COATED ORAL 2 TIMES DAILY
Qty: 20 TABLET | Refills: 0 | Status: SHIPPED | OUTPATIENT
Start: 2021-08-25 | End: 2021-09-04

## 2021-08-25 RX ORDER — TAMSULOSIN HYDROCHLORIDE 0.4 MG/1
0.4 CAPSULE ORAL DAILY
Qty: 5 CAPSULE | Refills: 0 | Status: SHIPPED | OUTPATIENT
Start: 2021-08-25 | End: 2021-08-30

## 2021-08-25 RX ORDER — 0.9 % SODIUM CHLORIDE 0.9 %
1000 INTRAVENOUS SOLUTION INTRAVENOUS ONCE
Status: COMPLETED | OUTPATIENT
Start: 2021-08-25 | End: 2021-08-25

## 2021-08-25 RX ORDER — LABETALOL HYDROCHLORIDE 5 MG/ML
10 INJECTION, SOLUTION INTRAVENOUS ONCE
Status: COMPLETED | OUTPATIENT
Start: 2021-08-25 | End: 2021-08-25

## 2021-08-25 RX ORDER — ONDANSETRON 4 MG/1
4 TABLET, ORALLY DISINTEGRATING ORAL EVERY 8 HOURS PRN
Qty: 10 TABLET | Refills: 0 | Status: SHIPPED | OUTPATIENT
Start: 2021-08-25

## 2021-08-25 RX ORDER — KETOROLAC TROMETHAMINE 30 MG/ML
30 INJECTION, SOLUTION INTRAMUSCULAR; INTRAVENOUS ONCE
Status: COMPLETED | OUTPATIENT
Start: 2021-08-25 | End: 2021-08-25

## 2021-08-25 RX ORDER — TAMSULOSIN HYDROCHLORIDE 0.4 MG/1
0.4 CAPSULE ORAL DAILY
Qty: 30 CAPSULE | Refills: 0 | Status: SHIPPED | OUTPATIENT
Start: 2021-08-25 | End: 2021-08-25

## 2021-08-25 RX ADMIN — SODIUM CHLORIDE 1000 ML: 9 INJECTION, SOLUTION INTRAVENOUS at 12:27

## 2021-08-25 RX ADMIN — LABETALOL HYDROCHLORIDE 10 MG: 5 INJECTION, SOLUTION INTRAVENOUS at 12:30

## 2021-08-25 RX ADMIN — KETOROLAC TROMETHAMINE 30 MG: 30 INJECTION, SOLUTION INTRAMUSCULAR; INTRAVENOUS at 12:27

## 2021-08-25 SDOH — ECONOMIC STABILITY: FOOD INSECURITY: WITHIN THE PAST 12 MONTHS, YOU WORRIED THAT YOUR FOOD WOULD RUN OUT BEFORE YOU GOT MONEY TO BUY MORE.: NEVER TRUE

## 2021-08-25 SDOH — ECONOMIC STABILITY: FOOD INSECURITY: WITHIN THE PAST 12 MONTHS, THE FOOD YOU BOUGHT JUST DIDN'T LAST AND YOU DIDN'T HAVE MONEY TO GET MORE.: NEVER TRUE

## 2021-08-25 ASSESSMENT — ENCOUNTER SYMPTOMS
VOMITING: 0
BACK PAIN: 0
VOMITING: 0
DIARRHEA: 0
EYE REDNESS: 0
SORE THROAT: 0
DIARRHEA: 0
STRIDOR: 0
CONSTIPATION: 0
SHORTNESS OF BREATH: 0
ABDOMINAL PAIN: 0
EYE PAIN: 0
SINUS PRESSURE: 0
CHEST TIGHTNESS: 0
CHEST TIGHTNESS: 0
SINUS PRESSURE: 0
WHEEZING: 0
CHOKING: 0
ABDOMINAL PAIN: 1
COUGH: 0
BLOOD IN STOOL: 0
COUGH: 0
FACIAL SWELLING: 0
TROUBLE SWALLOWING: 0
VOICE CHANGE: 0
EYE DISCHARGE: 0
SHORTNESS OF BREATH: 0
TROUBLE SWALLOWING: 0
BACK PAIN: 1

## 2021-08-25 ASSESSMENT — PAIN DESCRIPTION - PAIN TYPE
TYPE: ACUTE PAIN
TYPE: ACUTE PAIN

## 2021-08-25 ASSESSMENT — PAIN DESCRIPTION - PROGRESSION: CLINICAL_PROGRESSION: NOT CHANGED

## 2021-08-25 ASSESSMENT — PAIN DESCRIPTION - DESCRIPTORS
DESCRIPTORS: SHARP
DESCRIPTORS: ACHING

## 2021-08-25 ASSESSMENT — PAIN DESCRIPTION - LOCATION
LOCATION: FLANK
LOCATION: FLANK

## 2021-08-25 ASSESSMENT — PAIN DESCRIPTION - ORIENTATION
ORIENTATION: LEFT
ORIENTATION: LEFT

## 2021-08-25 ASSESSMENT — PAIN SCALES - GENERAL
PAINLEVEL_OUTOF10: 8
PAINLEVEL_OUTOF10: 2
PAINLEVEL_OUTOF10: 10

## 2021-08-25 ASSESSMENT — PAIN DESCRIPTION - ONSET: ONSET: SUDDEN

## 2021-08-25 ASSESSMENT — PAIN DESCRIPTION - FREQUENCY
FREQUENCY: CONTINUOUS
FREQUENCY: CONTINUOUS

## 2021-08-25 ASSESSMENT — SOCIAL DETERMINANTS OF HEALTH (SDOH): HOW HARD IS IT FOR YOU TO PAY FOR THE VERY BASICS LIKE FOOD, HOUSING, MEDICAL CARE, AND HEATING?: NOT HARD AT ALL

## 2021-08-25 NOTE — PROGRESS NOTES
per Session:    Stress:     Feeling of Stress :    Social Connections:     Frequency of Communication with Friends and Family:     Frequency of Social Gatherings with Friends and Family:     Attends [employer] Services:     Active Member of Clubs or Organizations:     Attends Club or Organization Meetings:     Marital Status:    Intimate Partner Violence:     Fear of Current or Ex-Partner:     Emotionally Abused:     Physically Abused:     Sexually Abused:      Family History   Problem Relation Age of Onset    Cancer Father         PANCREATIC     Allergies   Allergen Reactions    Bee Venom      Current Outpatient Medications   Medication Sig Dispense Refill    lisinopril (PRINIVIL;ZESTRIL) 5 MG tablet Take 1 tablet by mouth daily 90 tablet 1    valACYclovir (VALTREX) 500 MG tablet Take 1 tablet by mouth daily 30 tablet 3    EPINEPHrine (EPIPEN) 0.3 MG/0.3ML JANETH injection Inject 0.3 mLs into the muscle once as needed (reaction) 1 Device 0     No current facility-administered medications for this visit. Review of Systems   Constitutional: Negative for activity change, appetite change, chills, fever and unexpected weight change. HENT: Negative for drooling, ear pain, nosebleeds, sinus pressure and trouble swallowing. Respiratory: Negative for cough, chest tightness and shortness of breath. Cardiovascular: Negative for chest pain and leg swelling. Gastrointestinal: Negative for abdominal pain, diarrhea and vomiting. Endocrine: Negative for polydipsia and polyphagia. Genitourinary: Positive for flank pain (left). Negative for dysuria and frequency. Musculoskeletal: Negative for back pain and myalgias. Skin: Negative for pallor and rash. Neurological: Negative for syncope, weakness and headaches. Hematological: Does not bruise/bleed easily. Psychiatric/Behavioral: Negative for agitation, behavioral problems and confusion.    All other systems reviewed and are Hernia: No hernia is present. Musculoskeletal:         General: No swelling, tenderness, deformity or signs of injury. Normal range of motion. Cervical back: Normal range of motion and neck supple. No rigidity. Lymphadenopathy:      Head:      Right side of head: No submental adenopathy. Left side of head: No submental adenopathy. Skin:     General: Skin is warm and dry. Capillary Refill: Capillary refill takes less than 2 seconds. Coloration: Skin is not jaundiced or pale. Findings: No bruising, erythema, lesion or rash. Neurological:      General: No focal deficit present. Mental Status: He is alert and oriented to person, place, and time. Mental status is at baseline. Cranial Nerves: No cranial nerve deficit. Sensory: No sensory deficit. Motor: No weakness. Coordination: Coordination normal.      Deep Tendon Reflexes: Reflexes are normal and symmetric. Psychiatric:         Mood and Affect: Mood normal.         Behavior: Behavior normal. Behavior is cooperative. Thought Content: Thought content normal.         Judgment: Judgment normal.         Assessment:       Diagnosis Orders   1. Flank pain  POCT Urinalysis No Micro (Auto)    Culture, Urine    XR ABDOMEN (KUB) (SINGLE AP VIEW)    Ambulatory referral to Urology   2. Nephrolithiasis  XR ABDOMEN (KUB) (SINGLE AP VIEW)    Ambulatory referral to Urology     No results found for this visit on 08/25/21. Plan:     Assessment & Plan   Michael Mcgraw was seen today for flank pain. Diagnoses and all orders for this visit:    Flank pain  -     POCT Urinalysis No Micro (Auto)  -     Culture, Urine; Future  -     XR ABDOMEN (KUB) (SINGLE AP VIEW); Future  -     Ambulatory referral to Urology    Nephrolithiasis  -     XR ABDOMEN (KUB) (SINGLE AP VIEW);  Future  -     Ambulatory referral to Urology      Orders Placed This Encounter   Procedures    Culture, Urine     Standing Status:   Future     Standing Expiration Date:   8/25/2022     Order Specific Question:   Specify (ex-cath, midstream, cysto, etc)? Answer:   midstream    XR ABDOMEN (KUB) (SINGLE AP VIEW)     Standing Status:   Future     Standing Expiration Date:   8/25/2022     Order Specific Question:   Reason for exam:     Answer:   r/o ureterolithiasis    Ambulatory referral to Urology     Referral Priority:   Routine     Referral Type:   Eval and Treat     Referral Reason:   Specialty Services Required     Referred to Provider:   Lucila Mike MD     Number of Visits Requested:   1    POCT Urinalysis No Micro (Auto)     No orders of the defined types were placed in this encounter. There are no discontinued medications. No follow-ups on file. Reviewed with the patient/family: current clinical status & medications. Side effects of the medication prescribed today, as well as treatment plan/rationale and result expectations have been discussed with the patient/family who expresses understanding. Patient will be discharged home in stable condition. Follow up with PCP to evaluate treatment results or return if symptoms worsen or fail to improve. Discussed signs and symptoms which require immediate follow-up in ED/call to 911. Understanding verbalized. I have reviewed the patient's medical history in detail and updated the computerized patient record.     MAGDALENA Guevara

## 2021-08-25 NOTE — ED TRIAGE NOTES
Patient presents to ED with c/o left flank pain that started last night.  He was seen in urgent care and sent for xrays and told he may have a kidney stone and sent here

## 2021-08-25 NOTE — ED PROVIDER NOTES
[facility] ED  eMERGENCY dEPARTMENT eNCOUnter      Pt Name: Kim Ayers  MRN: [id number]  [place] 1975  Date of evaluation: 8/25/2021  Provider: Loulou Lind MD    [address] [place] [address]       Chief Complaint   Patient presents with    Flank Pain     left side pain started this morning was seen at urgent care and sent here stating they saw Kidney stones on his info          HISTORY OF PRESENT ILLNESS   (Location/Symptom, Timing/Onset,Context/Setting, Quality, Duration, Modifying Factors, Severity)  Note limiting factors. Kim Ayers is a 48 y.o. male who presents to the emergency department patient has  prior history of kidney stones, approximately last attack was 10 years ago,  history of anxiety depression and bipolar disorder history hypertension noted and left-sided pain sudden onset this morning no injury no fall patient did go to urgent care where he was seen and sent here for evaluation for possible renal colic has no blood in the urine no trouble passing urine as per patient 3:00 in the morning pain woke him up pain going to the groin into the left testicle he took some leftover pain pill from the dentist which is of the pain but pain came back again patient admitted he is not very compliant with blood pressure medication he did not take his blood pressure medication this morning plain x-ray performed in outpatient setting which was nonspecific    HPI    NursingNotes were reviewed. REVIEW OF SYSTEMS    (2-9 systems for level 4, 10 or more for level 5)     Review of Systems   Constitutional: Negative. Negative for activity change and fever. HENT: Negative for congestion, drooling, facial swelling, mouth sores, nosebleeds, sinus pressure, sore throat, trouble swallowing and voice change. Eyes: Negative for pain, discharge, redness and visual disturbance. Respiratory: Negative for cough, choking, chest tightness, shortness of breath, wheezing and stridor.     Cardiovascular: Negative for chest pain, palpitations and leg swelling. Gastrointestinal: Positive for abdominal pain. Negative for blood in stool, constipation, diarrhea and vomiting. Endocrine: Negative for cold intolerance, polyphagia and polyuria. Genitourinary: Positive for flank pain. Negative for dysuria, frequency, genital sores and urgency. Musculoskeletal: Positive for back pain. Negative for joint swelling, neck pain and neck stiffness. Skin: Negative for pallor and rash. Neurological: Negative for tremors, seizures, syncope, weakness, numbness and headaches. Hematological: Negative for adenopathy. Does not bruise/bleed easily. Psychiatric/Behavioral: Negative for agitation, behavioral problems, hallucinations and sleep disturbance. The patient is nervous/anxious. The patient is not hyperactive. All other systems reviewed and are negative. Except as noted above the remainder of the review of systems was reviewed and negative. PAST MEDICAL HISTORY     Past Medical History:   Diagnosis Date    Anxiety     Anxiety and depression     Hypertension          SURGICALHISTORY     History reviewed. No pertinent surgical history.       CURRENT MEDICATIONS       Discharge Medication List as of 8/25/2021  1:24 PM      CONTINUE these medications which have NOT CHANGED    Details   EPINEPHrine (EPIPEN) 0.3 MG/0.3ML JANETH injection Inject 0.3 mLs into the muscle once as needed (reaction), Disp-1 Device, R-0Print      ciprofloxacin (CIPRO) 500 MG tablet Take 1 tablet by mouth 2 times daily for 10 days, Disp-20 tablet, R-0Normal      lisinopril (PRINIVIL;ZESTRIL) 5 MG tablet Take 1 tablet by mouth daily, Disp-90 tablet, R-1Normal      valACYclovir (VALTREX) 500 MG tablet Take 1 tablet by mouth daily, Disp-30 tablet, R-3Normal             ALLERGIES     Bee venom    FAMILY HISTORY       Family History   Problem Relation Age of Onset    Cancer Father         PANCREATIC          SOCIAL HISTORY       Social History     Socioeconomic History    Marital status:      Spouse name: None    Number of children: None    Years of education: None    Highest education level: None   Occupational History    None   Tobacco Use    Smoking status: Former Smoker     Packs/day: 2.00     Years: 15.00     Pack years: 30.00     Types: Cigarettes     Quit date: 10/15/2005     Years since quitting: 15.8    Smokeless tobacco: Never Used   Vaping Use    Vaping Use: Never used   Substance and Sexual Activity    Alcohol use: Yes     Alcohol/week: 4.0 standard drinks     Types: 4 Cans of beer per week     Comment: occasional    Drug use: No    Sexual activity: Yes     Partners: Female   Other Topics Concern    None   Social History Narrative    None     Social Determinants of Health     Financial Resource Strain: Low Risk     Difficulty of Paying Living Expenses: Not hard at all   Food Insecurity: No Food Insecurity    Worried About Running Out of Food in the Last Year: Never true    Marli of Food in the Last Year: Never true   Transportation Needs:     Lack of Transportation (Medical):      Lack of Transportation (Non-Medical):    Physical Activity:     Days of Exercise per Week:     Minutes of Exercise per Session:    Stress:     Feeling of Stress :    Social Connections:     Frequency of Communication with Friends and Family:     Frequency of Social Gatherings with Friends and Family:     Attends Buddhist Services:     Active Member of Clubs or Organizations:     Attends Club or Organization Meetings:     Marital Status:    Intimate Partner Violence:     Fear of Current or Ex-Partner:     Emotionally Abused:     Physically Abused:     Sexually Abused:        SCREENINGS    Gina Coma Scale  Eye Opening: Spontaneous  Best Verbal Response: Oriented  Best Motor Response: Obeys commands  [place] Coma Scale Score: 15 @FLOW([id number])@      PHYSICAL EXAM    (up to 7 for level 4, 8 or more for level 5)     ED Triage Vitals [08/25/21 1212]   BP Temp Temp Source Pulse Resp SpO2 Height Weight   (!) 170/120 98.3 °F (36.8 °C) Oral 73 16 96 % 5' 7\" (1.702 m) 162 lb (73.5 kg)       Physical Exam  Vitals and nursing note reviewed. Constitutional:       General: He is in acute distress. Appearance: He is not ill-appearing or diaphoretic. Comments: Alert cooperative to examination moving all extremities ambulatory   HENT:      Head: Atraumatic. Right Ear: Tympanic membrane, ear canal and external ear normal.      Left Ear: Tympanic membrane, ear canal and external ear normal.      Nose: Nose normal. No congestion. Mouth/Throat:      Mouth: Mucous membranes are moist.   Eyes:      General: No scleral icterus. Left eye: No discharge. Cardiovascular:      Rate and Rhythm: Normal rate and regular rhythm. Pulses: Normal pulses. Heart sounds: No murmur heard. No friction rub. No gallop. Pulmonary:      Breath sounds: No rales. Chest:      Chest wall: No tenderness. Abdominal:      General: There is no distension. Palpations: There is no mass. Tenderness: There is no abdominal tenderness. There is no right CVA tenderness or guarding. Hernia: No hernia is present. Musculoskeletal:         General: No swelling, tenderness, deformity or signs of injury. Normal range of motion. Cervical back: No rigidity. Right lower leg: No edema. Left lower leg: No edema. Lymphadenopathy:      Cervical: No cervical adenopathy. Skin:     Coloration: Skin is not jaundiced or pale. Findings: No bruising, erythema or rash. Neurological:      General: No focal deficit present. Mental Status: He is alert. Mental status is at baseline. Cranial Nerves: No cranial nerve deficit. Sensory: No sensory deficit. Motor: No weakness.       Coordination: Coordination normal.      Gait: Gait normal.      Deep Tendon Reflexes: Reflexes normal.   Psychiatric: Mood and Affect: Mood normal.         DIAGNOSTIC RESULTS     EKG: All EKG's are interpreted by the Emergency Department Physician who either signs or Co-signsthis chart in the absence of a cardiologist.        RADIOLOGY:   Melquiades Smoker such as CT, Ultrasound and MRI are read by the radiologist. Plain radiographic images are visualized and preliminarily interpreted by the emergency physician with the below findings:        Interpretation per the Radiologist below, if available at the time ofthis note:    CT ABDOMEN PELVIS WO CONTRAST Additional Contrast? None   Final Result      1. Moderate hydroureteronephrosis is seen on the left. An obstructing stone is seen at the vesicoureteral junction. Perinephric stranding is seen as well. 2. Multiple nodules are seen in the visualized bases of the lungs. Some are calcified of those are not. Recommendations for follow-up and management of nodules smaller than 8 mm   Detected incidentally at non-screening CT   Nodule size (mm) less than or equal to 4   *   Low risk patients: no follow-up needed   *   High risk patients: Follow-up in at 12 months and if no change, no further imaging needed. Nodule size > 4-6 mm   *    Low risk patients: Follow-up at 12 months and if no change, no further imaging needed. *    High risk patients: Initial follow-up CT at 6-12 months and then at 18-24 months if no change   Nodule size > 6-8 mm   *     Low risk patient;  initial follow-up is CT at 6-12 months and then at 18-24 months if no change. *     High risk patients: Initial follow-up CT in 3- 6 months and then at 9-12 and 24 months if no           change. Nodule size > 8mm   *     Either low or high risk patients:   *     Follow-up CTs at around 3, 9, and 24 months   *     Dynamic contrast-enhanced CT, PET, and/or biopsy   Note:  Newly detected indeterminate nodule in persons 28years of age or older.    *      Low risk patient's; minimal or absent history of smoking and or other known risk factors. *      High risk patients: History of smoking or of other known risk factors. All CT scans at this facility use dose modulation, iterative reconstruction, and/or weight based dosing when appropriate to reduce radiation dose to as low as reasonably achievable. ED BEDSIDE ULTRASOUND:   Performed by ED Physician - none    LABS:  Labs Reviewed   COMPREHENSIVE METABOLIC PANEL - Abnormal; Notable for the following components:       Result Value    Glucose 160 (*)     Calcium 10.1 (*)     Total Bilirubin 1.3 (*)     All other components within normal limits   CBC WITH AUTO DIFFERENTIAL - Abnormal; Notable for the following components:    RBC 4.55 (*)     MCV 94.5 (*)     MCH 33.0 (*)     RDW 11.2 (*)     Neutrophils % 72.6 (*)     Eosinophils % 0.4 (*)     Neutrophils Absolute 5.7 (*)     All other components within normal limits   MICROSCOPIC URINALYSIS - Abnormal; Notable for the following components:    RBC, UA 3-5 (*)     Bacteria, UA RARE (*)     All other components within normal limits   URINE RT REFLEX TO CULTURE   LIPASE   AMYLASE       All other labs were within normal range or not returned as of this dictation. EMERGENCY DEPARTMENT COURSE and DIFFERENTIAL DIAGNOSIS/MDM:   Vitals:    Vitals:    08/25/21 1212 08/25/21 1257   BP: (!) 170/120 138/79   Pulse: 73 75   Resp: 16 20   Temp: 98.3 °F (36.8 °C)    TempSrc: Oral    SpO2: 96% 98%   Weight: 162 lb (73.5 kg)    Height: 5' 7\" (1.702 m)            MDM    CRITICAL CARE TIME   Total Critical Care time was minutes, excluding separately reportableprocedures. There was a high probability of clinicallysignificant/life threatening deterioration in the patient's condition which required my urgent intervention. ONSULTS:  None    PROCEDURES:  Unless otherwise noted below, none     Procedures    FINAL IMPRESSION      1. Flank pain    2. Kidney stone    3. Lung nodules    4.  Essential hypertension DISPOSITION/PLAN   DISPOSITION Decision To Discharge 08/25/2021 01:33:49 PM      PATIENT REFERRED TO:  Bridget Carroll MD  [place] [phone]    In 1 week      Johny Watters MD  [address]  [phone]    In 1 week        DISCHARGE MEDICATIONS:  Discharge Medication List as of 8/25/2021  1:24 PM      START taking these medications    Details   HYDROcodone-acetaminophen (NORCO) 5-325 MG per tablet Take 1 tablet by mouth every 6 hours as needed for Pain for up to 4 days. , Disp-12 tablet, R-0Print      ondansetron (ZOFRAN ODT) 4 MG disintegrating tablet Take 1 tablet by mouth every 8 hours as needed for Nausea, Disp-10 tablet, R-0Print                (Please note that portions of this note were completed with a voice recognition program.  Efforts were made to edit the dictations but occasionally words are mis-transcribed.)    Yakelin Salas MD (electronically signed)  Attending Emergency Physician       Yakelin Salas MD  08/25/21 7404

## 2021-09-08 ENCOUNTER — TELEPHONE (OUTPATIENT)
Dept: UROLOGY | Age: 46
End: 2021-09-08

## 2021-09-08 DIAGNOSIS — N20.0 KIDNEY STONE: Primary | ICD-10-CM

## 2021-09-08 NOTE — TELEPHONE ENCOUNTER
CT ABDOMEN PELVIS WO CONTRAST     8/25/2021     Perinephric stranding is seen about the left kidney. There is also moderate hydroureteral nephrosis. A stone is seen at the vesicoureteral junction that measures 2.3 mm. The right kidney shows no evidence for hydronephrosis or nephrolithiasis. Impression       1. Moderate hydroureteronephrosis is seen on the left. An obstructing stone is seen at the vesicoureteral junction. Perinephric stranding is seen as well.       2. Multiple nodules are seen in the visualized bases of the lungs. Some are calcified of those are not. Recommendations for follow-up and management of nodules smaller than 8 mm   Detected incidentally at non-screening CT   Nodule size (mm) less than or equal to 4   *   Low risk patients: no follow-up needed   *   High risk patients: Follow-up in at 12 months and if no change, no further imaging needed. Nodule size > 4-6 mm   *    Low risk patients: Follow-up at 12 months and if no change, no further imaging needed. *    High risk patients: Initial follow-up CT at 6-12 months and then at 18-24 months if no change   Nodule size > 6-8 mm   *     Low risk patient;  initial follow-up is CT at 6-12 months and then at 18-24 months if no change. *     High risk patients: Initial follow-up CT in 3- 6 months and then at 9-12 and 24 months if no           change. Nodule size > 8mm   *     Either low or high risk patients: *     Follow-up CTs at around 3, 9, and 24 months   *     Dynamic contrast-enhanced CT, PET, and/or biopsy   Note:  Newly detected indeterminate nodule in persons 28years of age or older. *      Low risk patient's; minimal or absent history of smoking and or other known risk factors.    *      High risk patients: History of smoking or of other known risk factors.                All CT scans at this facility use dose modulation, iterative reconstruction, and/or weight based dosing when appropriate to reduce radiation dose to as low as reasonably achievable. Would you like to KUB prior?

## 2021-09-13 ENCOUNTER — HOSPITAL ENCOUNTER (OUTPATIENT)
Dept: GENERAL RADIOLOGY | Age: 46
Discharge: HOME | End: 2021-09-15
Payer: COMMERCIAL

## 2021-09-13 ENCOUNTER — OFFICE VISIT (OUTPATIENT)
Dept: UROLOGY | Age: 46
End: 2021-09-13
Payer: COMMERCIAL

## 2021-09-13 VITALS
DIASTOLIC BLOOD PRESSURE: 82 MMHG | SYSTOLIC BLOOD PRESSURE: 122 MMHG | HEIGHT: 67 IN | BODY MASS INDEX: 25.58 KG/M2 | WEIGHT: 163 LBS | HEART RATE: 67 BPM

## 2021-09-13 DIAGNOSIS — N23 RENAL COLIC: Primary | ICD-10-CM

## 2021-09-13 DIAGNOSIS — N20.0 KIDNEY STONE: ICD-10-CM

## 2021-09-13 PROCEDURE — 99203 OFFICE O/P NEW LOW 30 MIN: CPT | Performed by: UROLOGY

## 2021-09-13 PROCEDURE — G8427 DOCREV CUR MEDS BY ELIG CLIN: HCPCS | Performed by: UROLOGY

## 2021-09-13 PROCEDURE — 74018 RADEX ABDOMEN 1 VIEW: CPT

## 2021-09-13 PROCEDURE — 1036F TOBACCO NON-USER: CPT | Performed by: UROLOGY

## 2021-09-13 PROCEDURE — G8419 CALC BMI OUT NRM PARAM NOF/U: HCPCS | Performed by: UROLOGY

## 2021-09-13 NOTE — PROGRESS NOTES
[facility] UROLOGY EVALUATION NOTE                                                 H&P                                                                                                                                                 Reason for Visit  Left ureteral calculus    History of Present Illness  67-year-old male with past history of nephrolithiasis 10 years ago which he passed spontaneously  He was evaluated for severe left renal colic secondary to a 3 mm stone left distal ureter  Today's KUB shows no evidence of stone  Patient claims have passed a stone did not bring it in for analysis      Urologic Review of Systems/Symptoms  Previous history of kidney stones    Review of Systems  Hospitalization: None recent  All 14 categories of Review of Systems otherwise reviewed no other findings reported. Patient is a avid runner  Past Medical History:   Diagnosis Date    Anxiety     Anxiety and depression     Hypertension      History reviewed. No pertinent surgical history. Social History     Socioeconomic History    Marital status:      Spouse name: None    Number of children: None    Years of education: None    Highest education level: None   Occupational History    None   Tobacco Use    Smoking status: Former Smoker     Packs/day: 2.00     Years: 15.00     Pack years: 30.00     Types: Cigarettes     Quit date: 10/15/2005     Years since quitting: 15.9    Smokeless tobacco: Never Used   Vaping Use    Vaping Use: Never used   Substance and Sexual Activity    Alcohol use:  Yes     Alcohol/week: 4.0 standard drinks     Types: 4 Cans of beer per week     Comment: occasional    Drug use: No    Sexual activity: Yes     Partners: Female   Other Topics Concern    None   Social History Narrative    None     Social Determinants of Health     Financial Resource Strain: Low Risk     Difficulty of Paying Living Expenses: Not hard at all   Food Insecurity: No Food Insecurity    Worried About Running Out of Food in the Last Year: Never true    Ran Out of Food in the Last Year: Never true   Transportation Needs:     Lack of Transportation (Medical):  Lack of Transportation (Non-Medical):    Physical Activity:     Days of Exercise per Week:     Minutes of Exercise per Session:    Stress:     Feeling of Stress :    Social Connections:     Frequency of Communication with Friends and Family:     Frequency of Social Gatherings with Friends and Family:     Attends Mormon Services:     Active Member of Clubs or Organizations:     Attends Club or Organization Meetings:     Marital Status:    Intimate Partner Violence:     Fear of Current or Ex-Partner:     Emotionally Abused:     Physically Abused:     Sexually Abused:      Family History   Problem Relation Age of Onset    Cancer Father         PANCREATIC     Current Outpatient Medications   Medication Sig Dispense Refill    sertraline (ZOLOFT) 50 MG tablet Take 75 mg by mouth daily      lisinopril (PRINIVIL;ZESTRIL) 5 MG tablet Take 1 tablet by mouth daily 90 tablet 1    valACYclovir (VALTREX) 500 MG tablet Take 1 tablet by mouth daily 30 tablet 3    ondansetron (ZOFRAN ODT) 4 MG disintegrating tablet Take 1 tablet by mouth every 8 hours as needed for Nausea (Patient not taking: Reported on 9/13/2021) 10 tablet 0    tamsulosin (FLOMAX) 0.4 MG capsule Take 1 capsule by mouth daily for 5 doses (Patient not taking: Reported on 9/13/2021) 5 capsule 0    EPINEPHrine (EPIPEN) 0.3 MG/0.3ML JANETH injection Inject 0.3 mLs into the muscle once as needed (reaction) (Patient not taking: Reported on 9/13/2021) 1 Device 0     No current facility-administered medications for this visit. Bee venom  All reviewed and verified by Dr Nena Whitaker on today's visit    No results found for: PSA, PSADIA  No results found for this visit on 09/13/21.     Physical Exam  Vitals:    09/13/21 1012   BP: 122/82   Pulse: 67   Weight: 163 lb (73.9 kg)   Height: 5' 7\" (1.702 m)     Constitutional: Not in distress. Cardiovascular: Normal rate, BP reviewed. Not remarkable  Pulmonary/Chest: Normal respiratory effort not short of breath  Abdominal: Not distended. KUB reviewed  Urologic Exam  CT reviewed  KUB reviewed  No evidence of other calculi. .  Assessment/Medical Necessity-Decision Making  Recurrent renal calculi  Risk factors include recurrent nature of the stones and being a runner  Patient advised about hydration  Dietary restrictions  Plan  Follow-up as needed  I will send a copy of this note to the primary care physician  There is an issue of pulmonary nodules that needs to be followed noted on CT  Patient has minimal risk factors for lung cancer  Greater than 50% of 30 minutes spent consulting patient face-to-face  No orders of the defined types were placed in this encounter. No orders of the defined types were placed in this encounter. Johny Watters MD       Please note this report has been partially produced using speech recognition software  And may cause contain errors related to that system including grammar, punctuation and spelling as well as words and phrases that may seem inappropriate. If there are questions or concerns please feel free to contact me to clarify.

## 2023-04-13 PROBLEM — I10 HYPERTENSION: Status: ACTIVE | Noted: 2023-04-13

## 2023-04-13 PROBLEM — J45.20 MILD INTERMITTENT ASTHMA WITHOUT COMPLICATION (HHS-HCC): Status: ACTIVE | Noted: 2023-04-13

## 2023-04-13 PROBLEM — R73.9 HYPERGLYCEMIA: Status: ACTIVE | Noted: 2023-04-13

## 2023-04-13 PROBLEM — R91.8 MULTIPLE LUNG NODULES ON CT: Status: ACTIVE | Noted: 2023-04-13

## 2023-04-14 ENCOUNTER — OFFICE VISIT (OUTPATIENT)
Dept: PRIMARY CARE | Facility: CLINIC | Age: 48
End: 2023-04-14
Payer: COMMERCIAL

## 2023-04-14 VITALS
TEMPERATURE: 97.9 F | OXYGEN SATURATION: 97 % | WEIGHT: 164 LBS | HEART RATE: 70 BPM | HEIGHT: 67 IN | SYSTOLIC BLOOD PRESSURE: 100 MMHG | BODY MASS INDEX: 25.74 KG/M2 | DIASTOLIC BLOOD PRESSURE: 70 MMHG | RESPIRATION RATE: 16 BRPM

## 2023-04-14 DIAGNOSIS — Z12.11 COLON CANCER SCREENING: ICD-10-CM

## 2023-04-14 DIAGNOSIS — F32.A ANXIETY AND DEPRESSION: ICD-10-CM

## 2023-04-14 DIAGNOSIS — J45.20 MILD INTERMITTENT ASTHMA WITHOUT COMPLICATION (HHS-HCC): Primary | ICD-10-CM

## 2023-04-14 DIAGNOSIS — R73.9 HYPERGLYCEMIA: ICD-10-CM

## 2023-04-14 DIAGNOSIS — F41.9 ANXIETY AND DEPRESSION: ICD-10-CM

## 2023-04-14 DIAGNOSIS — F31.12 BIPOLAR 1 DISORDER WITH MODERATE MANIA (MULTI): ICD-10-CM

## 2023-04-14 DIAGNOSIS — I10 HYPERTENSION, UNSPECIFIED TYPE: ICD-10-CM

## 2023-04-14 PROCEDURE — 3078F DIAST BP <80 MM HG: CPT | Performed by: FAMILY MEDICINE

## 2023-04-14 PROCEDURE — 99214 OFFICE O/P EST MOD 30 MIN: CPT | Performed by: FAMILY MEDICINE

## 2023-04-14 PROCEDURE — 3074F SYST BP LT 130 MM HG: CPT | Performed by: FAMILY MEDICINE

## 2023-04-14 RX ORDER — LISINOPRIL 5 MG/1
1 TABLET ORAL DAILY
COMMUNITY
Start: 2019-02-01 | End: 2023-11-17 | Stop reason: ALTCHOICE

## 2023-04-14 RX ORDER — ESCITALOPRAM OXALATE 10 MG/1
1 TABLET ORAL DAILY
COMMUNITY
Start: 2022-10-14 | End: 2023-12-08

## 2023-04-14 ASSESSMENT — PATIENT HEALTH QUESTIONNAIRE - PHQ9
1. LITTLE INTEREST OR PLEASURE IN DOING THINGS: NOT AT ALL
2. FEELING DOWN, DEPRESSED OR HOPELESS: NOT AT ALL
SUM OF ALL RESPONSES TO PHQ9 QUESTIONS 1 AND 2: 0

## 2023-04-14 NOTE — PROGRESS NOTES
"Subjective   Patient ID: Héctor Fong is a 47 y.o. male who presents for Anxiety, Hypertension, and Stress.  C19: x2 doses  Flu: UTD    HPI  Patient Active Problem List   Diagnosis    Anxiety and depression    Bipolar 1 disorder with moderate paddy (CMS/[facility])    Hyperglycemia    Hypertension    Mild intermittent asthma without complication    Multiple lung nodules on CT   138/80 bp by me  Stressed      Past Surgical History:   Procedure Laterality Date    WISDOM TOOTH EXTRACTION  1993       Review of Systems  This patient has   NO history of recent Covid nor flu symptoms,  NO Fever nor chills,  NO Chest pain, shortness of breath nor paroxysmal nocturnal dyspnea,  NO Nausea, vomiting, nor diarrhea,  NO Hematochezia nor melena,  NO Dysuria, hematuria, nor new incontinence issues  NO new severe headaches nor neurological complaints,  NO new issues with anxiety nor depression nor new psychiatric complaints,  NO suicidal nor homicidal ideations.     OBJECTIVE:  /70 (BP Location: Right arm, Patient Position: Sitting, BP Cuff Size: Adult)   Pulse 70   Temp 36.6 °C (97.9 °F) (Temporal)   Resp 16   Ht 1.702 m (5' 7\")   Wt 74.4 kg (164 lb)   SpO2 97%   BMI 25.69 kg/m²      General:  alert, oriented, no acute distress.  No obvious skin rashes noted.   No gait disturbance noted.    Mood is pleasant, not tearful, no signs of emotional distress.  Not appearing intoxicated or altered.   No voiced delusions,   Normal, appropriate behavior.    HEENT: Normocephalic, atraumatic,   Pupils round, reactive to light  Extraocular motions intact and wnl  Tympanic membranes normal    Neck: no nuchal rigidity  No masses palpable.  No carotid bruits.  No thyromegaly.    Respiratory: Equal breath sounds  No wheezes,    rales,    nor rhonchi  No respiratory distress.    Heart: Regular rate and rhythm, no    murmurs  no rubs/gallops    Abdomen: no masses palpable, nontender, no rebound nor guarding.    Extremities: NO cyanosis noted, " no clubbing.   No edema noted.  2+dorsalis pedis pulses.    Normal-not antalgic, steady gait.    No visits with results within 3 Month(s) from this visit.   Latest known visit with results is:   Legacy Encounter on 10/12/2022   Component Date Value Ref Range Status    Cholesterol 10/12/2022 181  0 - 199 mg/dL Final    Comment: .      AGE      DESIRABLE   BORDERLINE HIGH   HIGH     0-19 Y     0 - 169       170 - 199     >/= 200    20-24 Y     0 - 189       190 - 224     >/= 225         >24 Y     0 - 199       200 - 239     >/= 240   **All ranges are based on fasting samples. Specific   therapeutic targets will vary based on patient-specific   cardiac risk.  .   Pediatric guidelines reference:Pediatrics 2011, 128(S5).   Adult guidelines reference: NCEP ATPIII Guidelines,     AUDI 2001, 258:2486-97  .   Venipuncture immediately after or during the    administration of Metamizole may lead to falsely   low results. Testing should be performed immediately   prior to Metamizole dosing.      HDL 10/12/2022 71.0  mg/dL Final    Comment: .      AGE      VERY LOW   LOW     NORMAL    HIGH       0-19 Y       < 35   < 40     40-45     ----    20-24 Y       ----   < 40       >45     ----      >24 Y       ----   < 40     40-60      >60  .      Cholesterol/HDL Ratio 10/12/2022 2.5   Final    Comment: REF VALUES  DESIRABLE  < 3.4  HIGH RISK  > 5.0      LDL 10/12/2022 91  0 - 99 mg/dL Final    Comment: .                           NEAR      BORD      AGE      DESIRABLE  OPTIMAL    HIGH     HIGH     VERY HIGH     0-19 Y     0 - 109     ---    110-129   >/= 130     ----    20-24 Y     0 - 119     ---    120-159   >/= 160     ----      >24 Y     0 -  99   100-129  130-159   160-189     >/=190  .      VLDL 10/12/2022 19  0 - 40 mg/dL Final    Triglycerides 10/12/2022 94  0 - 149 mg/dL Final    Comment: .      AGE      DESIRABLE   BORDERLINE HIGH   HIGH     VERY HIGH   0 D-90 D    19 - 174         ----         ----        ----  91 D- 9 Y      0 -  74        75 -  99     >/= 100      ----    10-19 Y     0 -  89        90 - 129     >/= 130      ----    20-24 Y     0 - 114       115 - 149     >/= 150      ----         >24 Y     0 - 149       150 - 199    200- 499    >/= 500  .   Venipuncture immediately after or during the    administration of Metamizole may lead to falsely   low results. Testing should be performed immediately   prior to Metamizole dosing.      WBC 10/12/2022 5.0  4.4 - 11.3 x10E9/L Final    RBC 10/12/2022 4.68  4.50 - 5.90 x10E12/L Final    Hemoglobin 10/12/2022 15.2  13.5 - 17.5 g/dL Final    Hematocrit 10/12/2022 44.5  41.0 - 52.0 % Final    MCV 10/12/2022 95  80 - 100 fL Final    MCHC 10/12/2022 34.2  32.0 - 36.0 g/dL Final    Platelets 10/12/2022 230  150 - 450 x10E9/L Final    RDW 10/12/2022 11.6  11.5 - 14.5 % Final    Glucose 10/12/2022 79  74 - 99 mg/dL Final    Sodium 10/12/2022 139  136 - 145 mmol/L Final    Potassium 10/12/2022 4.7  3.5 - 5.3 mmol/L Final    Chloride 10/12/2022 101  98 - 107 mmol/L Final    Bicarbonate 10/12/2022 31  21 - 32 mmol/L Final    Anion Gap 10/12/2022 12  10 - 20 mmol/L Final    Urea Nitrogen 10/12/2022 15  6 - 23 mg/dL Final    Creatinine 10/12/2022 0.81  0.50 - 1.30 mg/dL Final    GFR MALE 10/12/2022 >90  >90 mL/min/1.73m2 Final    Comment:  CALCULATIONS OF ESTIMATED GFR ARE PERFORMED   USING THE 2021 CKD-EPI STUDY REFIT EQUATION   WITHOUT THE RACE VARIABLE FOR THE IDMS-TRACEABLE   CREATININE METHODS.    https://jasn.asnjournals.org/content/early/2021/09/22/ASN.6609459298      Calcium 10/12/2022 9.9  8.6 - 10.3 mg/dL Final    Albumin 10/12/2022 4.2  3.4 - 5.0 g/dL Final    Alkaline Phosphatase 10/12/2022 60  33 - 120 U/L Final    Total Protein 10/12/2022 7.4  6.4 - 8.2 g/dL Final    AST 10/12/2022 24  9 - 39 U/L Final    Total Bilirubin 10/12/2022 1.0  0.0 - 1.2 mg/dL Final    ALT (SGPT) 10/12/2022 31  10 - 52 U/L Final    Comment:  Patients treated with Sulfasalazine may generate    falsely  decreased results for ALT.          Assessment/Plan     Problem List Items Addressed This Visit          Respiratory    Mild intermittent asthma without complication - Primary       Circulatory    Hypertension       Other    Anxiety and depression    Relevant Orders    Follow Up In [facility] - Behavioral Health [facility]    Bipolar 1 disorder with moderate paddy (CMS/HCC)    Relevant Orders    Follow Up In Advanced [facility] - Behavioral Health Collaborative Care [facility]    Hyperglycemia     Other Visit Diagnoses       Colon cancer screening        Relevant Orders    Referral to Gastroenterology          Labs in 4-6mo  The patient is aware that results will be forthcoming of ALL planned labs and or tests. If no results are received on my chart or by letter within 1 - 3 weeks, the patient is aware they need to call to obtain results as this is not usual.   I have discussed the collaborative care model for this patient’s behavioral health care. Written detailed information and identifying the members of this care team was provided to patient. They give permission for the Behavioral Health Manager (BHM) and psychiatric consultant to be included in their care with my continued primary management. Patient made aware that services provided as part of the Collaborative Care Model are subject to cost sharing.    Colon ca screening due  Has NOT been on lexapro  Needs med every day LISINOPRIL

## 2023-04-18 ENCOUNTER — TELEPHONE (OUTPATIENT)
Dept: PRIMARY CARE | Facility: CLINIC | Age: 48
End: 2023-04-18
Payer: COMMERCIAL

## 2023-04-18 NOTE — PROGRESS NOTES
Outreach #1. Attempted to outreach pt regarding referral that was placed for Collab Care. LVM requesting follow up with continued interest.

## 2023-05-02 ENCOUNTER — TELEPHONE (OUTPATIENT)
Dept: PRIMARY CARE | Facility: CLINIC | Age: 48
End: 2023-05-02
Payer: COMMERCIAL

## 2023-05-02 NOTE — PROGRESS NOTES
Outreach #2: Writer made contact with pt regarding their referral to collab care. Scheduled in person assessment for 06/27 @ 11AM.

## 2023-05-09 ENCOUNTER — HOSPITAL ENCOUNTER (OUTPATIENT)
Dept: DATA CONVERSION | Facility: HOSPITAL | Age: 48
End: 2023-05-09
Attending: SURGERY | Admitting: SURGERY
Payer: COMMERCIAL

## 2023-05-09 DIAGNOSIS — I10 ESSENTIAL (PRIMARY) HYPERTENSION: ICD-10-CM

## 2023-05-09 DIAGNOSIS — Z12.11 ENCOUNTER FOR SCREENING FOR MALIGNANT NEOPLASM OF COLON: ICD-10-CM

## 2023-05-09 DIAGNOSIS — Z87.891 PERSONAL HISTORY OF NICOTINE DEPENDENCE: ICD-10-CM

## 2023-05-09 DIAGNOSIS — J44.9 CHRONIC OBSTRUCTIVE PULMONARY DISEASE, UNSPECIFIED (MULTI): ICD-10-CM

## 2023-05-11 LAB
ATRIAL RATE: 53 BPM
P AXIS: 57 DEGREES
P OFFSET: 204 MS
P ONSET: 146 MS
PR INTERVAL: 150 MS
Q ONSET: 221 MS
QRS COUNT: 8 BEATS
QRS DURATION: 92 MS
QT INTERVAL: 406 MS
QTC CALCULATION(BAZETT): 380 MS
QTC FREDERICIA: 389 MS
R AXIS: 15 DEGREES
T AXIS: 13 DEGREES
T OFFSET: 424 MS
VENTRICULAR RATE: 53 BPM

## 2023-06-27 ENCOUNTER — SOCIAL WORK (OUTPATIENT)
Dept: PRIMARY CARE | Facility: CLINIC | Age: 48
End: 2023-06-27
Payer: COMMERCIAL

## 2023-06-27 ASSESSMENT — PATIENT HEALTH QUESTIONNAIRE - PHQ9
8. MOVING OR SPEAKING SO SLOWLY THAT OTHER PEOPLE COULD HAVE NOTICED. OR THE OPPOSITE, BEING SO FIGETY OR RESTLESS THAT YOU HAVE BEEN MOVING AROUND A LOT MORE THAN USUAL: SEVERAL DAYS
10. IF YOU CHECKED OFF ANY PROBLEMS, HOW DIFFICULT HAVE THESE PROBLEMS MADE IT FOR YOU TO DO YOUR WORK, TAKE CARE OF THINGS AT HOME, OR GET ALONG WITH OTHER PEOPLE: SOMEWHAT DIFFICULT
2. FEELING DOWN, DEPRESSED OR HOPELESS: SEVERAL DAYS
3. TROUBLE FALLING OR STAYING ASLEEP: SEVERAL DAYS
9. THOUGHTS THAT YOU WOULD BE BETTER OFF DEAD, OR OF HURTING YOURSELF: NOT AT ALL
4. FEELING TIRED OR HAVING LITTLE ENERGY: MORE THAN HALF THE DAYS
7. TROUBLE CONCENTRATING ON THINGS, SUCH AS READING THE NEWSPAPER OR WATCHING TELEVISION: NEARLY EVERY DAY
1. LITTLE INTEREST OR PLEASURE IN DOING THINGS: SEVERAL DAYS
SUM OF ALL RESPONSES TO PHQ QUESTIONS 1-9: 11
SUM OF ALL RESPONSES TO PHQ9 QUESTIONS 1 & 2: 2
5. POOR APPETITE OR OVEREATING: SEVERAL DAYS
6. FEELING BAD ABOUT YOURSELF - OR THAT YOU ARE A FAILURE OR HAVE LET YOURSELF OR YOUR FAMILY DOWN: SEVERAL DAYS

## 2023-06-27 ASSESSMENT — ANXIETY QUESTIONNAIRES
5. BEING SO RESTLESS THAT IT IS HARD TO SIT STILL: SEVERAL DAYS
IF YOU CHECKED OFF ANY PROBLEMS ON THIS QUESTIONNAIRE, HOW DIFFICULT HAVE THESE PROBLEMS MADE IT FOR YOU TO DO YOUR WORK, TAKE CARE OF THINGS AT HOME, OR GET ALONG WITH OTHER PEOPLE: SOMEWHAT DIFFICULT
2. NOT BEING ABLE TO STOP OR CONTROL WORRYING: NEARLY EVERY DAY
7. FEELING AFRAID AS IF SOMETHING AWFUL MIGHT HAPPEN: SEVERAL DAYS
GAD7 TOTAL SCORE: 12
1. FEELING NERVOUS, ANXIOUS, OR ON EDGE: MORE THAN HALF THE DAYS
3. WORRYING TOO MUCH ABOUT DIFFERENT THINGS: NEARLY EVERY DAY
4. TROUBLE RELAXING: SEVERAL DAYS
6. BECOMING EASILY ANNOYED OR IRRITABLE: SEVERAL DAYS

## 2023-06-27 NOTE — PROGRESS NOTES
"Collaborative Care ([facility]) Initial Assessment    Session Time  Start: 10:53am  End: 12:03pm     Collaborative Care program information (including case discussion with psychiatry, involvement of [facility] and billing when applicable) was provided and discussed with the patient. Patient Indicated understanding and agreed to proceed.   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 I've been split up with my wife for 2 1/2 years and I've been with someone else for 2 years. I don't know what to do I just run from everything, I work more\".       Review of Symptoms    Sleep   Sleep Symptoms: Denies concerns. Pt shared, \"I sleep through the night, normally, sometimes I'll have a hard time going to sleep. But that could be because I took a nap or something\".       Mood   Symptom Onset/Duration: Last year--\"I've always had difficulty making decisions, but definitely in the past year\".   Current Sx: feeling tired/little energy and trouble concentrating Pt described his mood on an average basis as, \"The first two hours of the day stressed, and then relaxed. I don't know fine. I'm just a wreck in my mind. Because I'm stuck, I could be living some awesome life and I am not. I'm living in this revolving [place] where I can't make a decision with my wife or my girlfriend. I'm terrible about making any decision\".     Anxiety   Symptom Onset/Duration: Last year---\"It goes away for periods of time because of all this stuff, work, taxes, self employment\".   Current Sx: feeling nervous/anxious/on edge, difficulty " "stopping/controlling worry, and worrying too much Pt shared, \"I feel it all the time, I can feel it going down my arm. I feel it all the time and sometimes it's bad\". Hx of going on and off medications for a month or two.   Panic / Somatic Sx:  Hx of, \"It's not so much to that degree now\".     Self-Esteem / Self-Image   Self Esteem Rating (1-10 Scale, 10 being high): 6-7   Self-Esteem / Self Image Sx: struggles with confidence Pt shared, \"It's sometimes great, sometimes it's not\".     Appetite   Description of Overall Appetite: increased appetite  Eating Behaviors: significant consumption fast food/unhealthy snacks Pt shared, \"I eat too much, I'm snacking now because I haven't drank in 28 days. Now I'm picking at food like I did when I quit smoking\".   Concerns with appetite: fear of gaining weight    Anger / Irritability  Symptoms of Anger / Irritability: anger outbursts weekly, verbal anger, easily agitated, difficulty controlling anger, and throws things Pt shared, \"If I get mad about something I used to yell and scream. I think part of it is because I've left my wife before about ten years ago\". Pt reported his anger is getting mad for about an hour and then he is fine, \"When I get mad I get pretty mad\".     Trauma    Symptoms Onset/Duration: symptoms more than one month  Traumatic Experiences: grew up / living in poverty Pt shared, \"I grew up poor, bounced around from mom, to aunt, to alcoholic dad\".   Current Symptoms Related to Traumatic Experience: interferes with relationships--\"I think my first love destroyed me when I was young, I thought was she the love of my life and she would just cheat on me all the time. I think that is when I started getting anxiety and panic attacks\". This is daughters mother, pt had full custody of their daughter when she was [age]. They still see each other and interact with each other through work.     Grief / Loss / Adjustment   Symptom Onset/Duration: more than 1 year  Current " "Sx: Pt's father  when he was 48, \"So forever I didn't think I was going to live to be 48\". Reported his 48th birthday is on Friday.   Factors of Grief / Loss / Adjustment: loss of loved one(s)    Learning Concerns / Memory   Learning Concerns & Sx: trouble with focus and concentrating Pt shared, \"Everyone tells me that (problems with concentration), billing and paperwork I hate it. I forget stuff. I can't sit and concentrate so I took adderall and it did help me. I would do all this billing and paperwork\".     Functional impairment   Impacting Ability : to focus/concentrate    Associated Medical Concerns   Potential Associated Factors: None    Comprehensive Behavioral Health History     Medications  Current Mental Health Medications:   None/Unknown    Past Mental Health Medications:   Lexapro / escitalopram; Dose: 10mg; Side effects: N/A  Zoloft \"A long time ago\".     \"I've just never taken them the way I'm supposed to, ever\".     Open to medication recommendations from consulting psychiatrist? Yes--\"I don't want something that makes me tired, I'm so tired every day. Or is it from all this. I have no energy in the morning and then I just crash\".     Mental Health Treatment History  Mental Health Treatment: individual therapy  Reason/When/Where/Outcome: \"I went like three times, it was weird\".     Risk History  Suicidal Thoughts/Method/Intent/Plan: None, denied  Suicide Attempts/Preparations: interrupted attempt--\"When I was 19, I was with my ex wife. I was drunk and took a whole bottle of tylenol and they pumped my stomach and stuff\".   Number of Suicide Attempts: 1  Access to Firearms/Lethal Means: gun in home  Non-Suicidal Self Injury: None, denied  Last [place] Risk Score:    Protective Factors: positive family relationships    Substance Use History    Substances    Social History     Substance and Sexual Activity   Alcohol Use Yes    Comment: social     Social History     Substance and Sexual Activity " "  Drug Use Never       Substance Current Use   Alcohol No---Has not been using for 28 days. Pt shared, \"Cause I was drinking every day. This was stupid, a waste of money. Waking up feeling like shit\". Reported if he is mad at someone and has too much to drink he becomes verbally aggressive.                  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     Income   Current Concerns/Challenges: No  Receive Benefits/Assistance: No    Education   Status / Level of Education: Completed high school    Legal   Legal Considerations: Wife and pt are . Pt shared, \"I hired a  to do a dissolution and she doesn't want one, she just wants me back no matter what. She sends emails and messages and texts\".     Relationships   S/O:  Wife  22y , Girlfriend ---couple years.   Parents/Guardian: Mother lives in [place]   Siblings: Brother and two sister, close with sisters   Other: Two daughters, 28y and 19y     Alevism/ Spirituality   Are you Taoist or Spiritual: Yes  Alevism / Practice: Non-Worship    Coping / Strengths / Supports   Coping:  Pt shared, \"I used to go to [facility] in [place] and have drinks and sit down and talk to everyone and make friends. 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 I run, not as much because I'm stressed. If I'm stressed I can't. Sometimes it has helped me mentally. I'll work, make more money, for what I don't know\".   Strengths: Pt shared, \"Everyone " "thinks I'm helpful, a giver\".   Supports: Pt shared, \"My daughter mainly. It was my best friend Mehdi, I think I needed to get away from him but we would drink every night\".     Assessment Summary  / Plan    Assessment Summary:  What do you want to work on/get out of collaborative care? Pt shared, \"I want you to tell me what to do\".     BA values? Gave BARRS     Plan:   Psych consult - ongoing, bi-weekly, and [id number] interventions    Follow up in 16 days (on 7/13/2023).    Provisional Findings / Impressions  Primary: Generalized Anxiety Disorder, Moderate       Secondary: R/O ADHD    "

## 2023-06-30 ENCOUNTER — DOCUMENTATION (OUTPATIENT)
Dept: PRIMARY CARE | Facility: CLINIC | Age: 48
End: 2023-06-30
Payer: COMMERCIAL

## 2023-06-30 DIAGNOSIS — F32.A ANXIETY AND DEPRESSION: Primary | ICD-10-CM

## 2023-06-30 DIAGNOSIS — F41.9 ANXIETY AND DEPRESSION: Primary | ICD-10-CM

## 2023-06-30 PROCEDURE — 99492 1ST PSYC COLLAB CARE MGMT: CPT | Performed by: FAMILY MEDICINE

## 2023-07-10 ENCOUNTER — DOCUMENTATION (OUTPATIENT)
Dept: BEHAVIORAL HEALTH | Facility: CLINIC | Age: 48
End: 2023-07-10
Payer: COMMERCIAL

## 2023-07-10 NOTE — PROGRESS NOTES
[facility] Psychiatry Consult Note     Héctor Fong is a 48 y.o., referred to Collaborative Care carrying diagnosis of bipolar disorder, but negative MDQ and main problem is anxiety. I have reviewed the patient with the behavioral health manager and reviewed the patient's electronic record. Was drinking daily, which he felt was important to cut back and hasn't had a drink for 3 weeks or more.     Past psych meds:  Escitalopram 10 daily, never really took it regularly    Recommendations:   Re-try escitalopram 10mg daily for anxiety, at bedtime, can increase to 20mg if it is partially helping.    Patient Health Questionnaire-9 Score: 11 (6/27/2023 12:06 PM)  WALE-7 Total Score: 12 (6/27/2023 12:07 PM)      The above treatment considerations and suggestions are based on consultations with the patient's care manager and a review of information available in the electronic medical record. I have not personally examined the patient. All recommendations should be implemented with consideration of the patient's relevant prior history and current clinical status. Please feel free to call me with any questions about the care of this patient. I can easily be reached through [employer].

## 2023-07-13 ENCOUNTER — TELEPHONE (OUTPATIENT)
Dept: PRIMARY CARE | Facility: CLINIC | Age: 48
End: 2023-07-13

## 2023-07-13 ENCOUNTER — SOCIAL WORK (OUTPATIENT)
Dept: PRIMARY CARE | Facility: CLINIC | Age: 48
End: 2023-07-13
Payer: COMMERCIAL

## 2023-07-13 NOTE — PROGRESS NOTES
Pt is requesting to move forward with psych recommendations:     Recommendations:   Re-try escitalopram 10mg daily for anxiety, at bedtime, can increase to 20mg if it is partially helping.

## 2023-07-13 NOTE — PROGRESS NOTES
Collaborative Care ([facility])  Progress Note    Type of Interaction: Phone    Start Time: 1:32pm    End Time: 2:20pm    Appointment: Not Scheduled    Reason for Visit:   Chief Complaint   Patient presents with    Anxiety      Interval History / Patient Symptoms:     Anxious     Interventions Provided: Psychoeducation, Motivational Interviewing, Solution Focused Therapy, Values Exploration, Review Progress/Goals Stress Management, Mindfulness, Homework F/U, and Treatment Planning    Progress Made: Minimum    Response to Intervention: Pt shared no changes in anxiety since first assessment engagement. Reviewed psych recs and pet would like to move forward with recommendations. Shared that he started drinking again on his birthday, defined his alcohol use as less than before. Recalled going to [employer] sporadically and purchasing a [place]. Going to [employer] with his family next week. Assessed this as impulsivity, pt denied. Continues to be in the same indecision state with his wife and girlfriend. Shared that his girlfriend is very frustrated with this, may have ended this relationship. Continues to feel a sense of guilt. Utilized BA values work to explore pt's values and how that relates to developing a healthy relationship. Pt shared values as: music, running, normality. Identified changes in his relationship with his girlfriend over time. Identified himself as wasting time.    Plan: Encouraged pt to explore what his definition of normality is for him. Assess mood post starting medication from psych recs.     Patient Instructions   Pt is scheduled for telehealth follow up on 08/03 @ 1pm.     Follow Up / Next Appointment: Next appointment: 08/03/23

## 2023-07-31 ENCOUNTER — DOCUMENTATION (OUTPATIENT)
Dept: PRIMARY CARE | Facility: CLINIC | Age: 48
End: 2023-07-31
Payer: COMMERCIAL

## 2023-07-31 DIAGNOSIS — F32.A ANXIETY AND DEPRESSION: Primary | ICD-10-CM

## 2023-07-31 DIAGNOSIS — F41.9 ANXIETY AND DEPRESSION: Primary | ICD-10-CM

## 2023-07-31 PROCEDURE — 99493 SBSQ PSYC COLLAB CARE MGMT: CPT | Performed by: FAMILY MEDICINE

## 2023-08-03 ENCOUNTER — SOCIAL WORK (OUTPATIENT)
Dept: PRIMARY CARE | Facility: CLINIC | Age: 48
End: 2023-08-03
Payer: COMMERCIAL

## 2023-08-03 NOTE — PROGRESS NOTES
Collaborative Care (Co[facility])  Progress Note    Type of Interaction: Phone    Start Time: 1:00pm    End Time: 1:52pm    Appointment: Not Scheduled    Reason for Visit:   Chief Complaint   Patient presents with    Anxiety     Interval History / Patient Symptoms:     Overwhelmed     Interventions Provided: Psychoeducation, Behavioral Activation, Motivational Interviewing, Strengths Exploration, Values Exploration, Review Progress/Goals Stress Management, Roleplaying, and Homework F/U    Progress Made: Minimum    Response to Intervention: Pt shared he is doing well today. Did not start the medication. Plans to start today. Did not go to [place] because of daughter's illness. Shared things have been “crazy”, work is busy. In his personal life, he continues to feel indecisive. Recalled events since last session, has started the process of filing for divorce. Reported that he started to write things down about his situation. Continues to avoid problems by over working himself. Would like to continue writing down things, using sticky notes. Processed fears of changing his current situation (guilt). Provided psychoeducation on avoidance as well as ways starting his medication will support his mental health balance. Pt shared that he has attended [employer], spending time with a friend who is very involved in the [employer]. Explored reflective journaling prompt options to support his decision making.     Plan: Pt did not yet start the medication, will reassess PHQ/WALE at next appt.     Patient Instructions   Pt is scheduled for telehealth check in on 08/30 @ 2:30pm.     Follow Up / Next Appointment: Next appointment: 08/30/23

## 2023-08-30 ENCOUNTER — APPOINTMENT (OUTPATIENT)
Dept: PRIMARY CARE | Facility: CLINIC | Age: 48
End: 2023-08-30
Payer: COMMERCIAL

## 2023-08-31 ENCOUNTER — SOCIAL WORK (OUTPATIENT)
Dept: PRIMARY CARE | Facility: CLINIC | Age: 48
End: 2023-08-31
Payer: COMMERCIAL

## 2023-08-31 NOTE — PROGRESS NOTES
Collaborative Care (Co[facility])  Progress Note    Type of Interaction: Phone    Start Time: 12:30pm    End Time: 1:15pm    Appointment: Not Scheduled    Reason for Visit:   Chief Complaint   Patient presents with    Anxiety      Interval History / Patient Symptoms:     No change, anxious, avoidance     Interventions Provided: Psychoeducation, Motivational Interviewing, Strengths Exploration, Values Exploration, Review Progress/Goals Stress Management, Homework F/U, and Treatment Planning    Progress Made: Minimum    Response to Intervention: Pt shared no changes in his life, everything is the same. Started the medication and then he forgot again. Has not taken in about a week and a half. Attempted to problem solved alternatives in his daily routine to support remembering the medication. Pt and girlfriend are still together, went to [employer] last week. Has not followed up on his affidavit paperwork that he previously has sent his . Pt shared he continues to feel as if he shouldn't file, “of course me and Lara would be done 100 percent”. Continues this indecisive thought process. Contemplating on taking a few weeks off to travel across country and “clear his head”. Continued to provide psychoeducation on avoidance as well as ways starting his medication will support his mental health balance. Pt has not been writing things down as he started to in the past, “I preach it to my friend”.     Plan: Pt still has not been taking his medication as prescribed, observed continued use of avoidance. Agreed to make an attempt to take medication by using a post it note as a daily reminder. Writer to reassess PHQ/WALE at next appt if pt has started his medication.     Patient Instructions   Pt is scheduled for phone follow up on 09/27 @ 12:30pm.     Follow Up / Next Appointment: Next appointment: 09/27/23

## 2023-09-01 ENCOUNTER — DOCUMENTATION (OUTPATIENT)
Dept: PRIMARY CARE | Facility: CLINIC | Age: 48
End: 2023-09-01
Payer: COMMERCIAL

## 2023-09-01 DIAGNOSIS — F41.9 ANXIETY AND DEPRESSION: Primary | ICD-10-CM

## 2023-09-01 DIAGNOSIS — F32.A ANXIETY AND DEPRESSION: Primary | ICD-10-CM

## 2023-09-01 PROCEDURE — 99494 1ST/SBSQ PSYC COLLAB CARE: CPT | Performed by: FAMILY MEDICINE

## 2023-09-01 PROCEDURE — 99493 SBSQ PSYC COLLAB CARE MGMT: CPT | Performed by: FAMILY MEDICINE

## 2023-09-27 ENCOUNTER — APPOINTMENT (OUTPATIENT)
Dept: PRIMARY CARE | Facility: CLINIC | Age: 48
End: 2023-09-27
Payer: COMMERCIAL

## 2023-10-09 ENCOUNTER — TELEPHONE (OUTPATIENT)
Dept: PRIMARY CARE | Facility: CLINIC | Age: 48
End: 2023-10-09
Payer: COMMERCIAL

## 2023-10-09 NOTE — PROGRESS NOTES
Pt last engaged in M services on 9/4/23. Cancelled last scheduled appointment without rescheduling. For this reason, [facility]. In the future, if additional needs arise, be can be re referred to services.

## 2023-11-13 ENCOUNTER — OFFICE VISIT (OUTPATIENT)
Dept: PRIMARY CARE | Facility: CLINIC | Age: 48
End: 2023-11-13
Payer: COMMERCIAL

## 2023-11-13 ENCOUNTER — TELEPHONE (OUTPATIENT)
Dept: PRIMARY CARE | Facility: CLINIC | Age: 48
End: 2023-11-13

## 2023-11-13 VITALS
RESPIRATION RATE: 18 BRPM | WEIGHT: 170 LBS | TEMPERATURE: 98.4 F | SYSTOLIC BLOOD PRESSURE: 148 MMHG | BODY MASS INDEX: 26.63 KG/M2 | DIASTOLIC BLOOD PRESSURE: 86 MMHG | HEART RATE: 85 BPM | OXYGEN SATURATION: 98 %

## 2023-11-13 DIAGNOSIS — R03.0 ELEVATED BLOOD PRESSURE READING: Primary | ICD-10-CM

## 2023-11-13 PROCEDURE — 3077F SYST BP >= 140 MM HG: CPT | Performed by: NURSE PRACTITIONER

## 2023-11-13 PROCEDURE — 3079F DIAST BP 80-89 MM HG: CPT | Performed by: NURSE PRACTITIONER

## 2023-11-13 PROCEDURE — 99213 OFFICE O/P EST LOW 20 MIN: CPT | Performed by: NURSE PRACTITIONER

## 2023-11-13 ASSESSMENT — ENCOUNTER SYMPTOMS
DIARRHEA: 0
FEVER: 0
FATIGUE: 1
ACTIVITY CHANGE: 0
NAUSEA: 0
SHORTNESS OF BREATH: 0
HEADACHES: 1
VOMITING: 0
COUGH: 0
HYPERTENSION: 1
CHILLS: 0
CONSTIPATION: 0
SLEEP DISTURBANCE: 0
APPETITE CHANGE: 0
CHEST TIGHTNESS: 0
PALPITATIONS: 0

## 2023-11-13 NOTE — ASSESSMENT & PLAN NOTE
Advised to increase lisinopril to 10 mg daily  Can start Lexapro daily   Encouraged to check BP daily at the same time and record  Should follow up with PCP at first available appt  Advised to go to ER with any elevated readings for further management

## 2023-11-13 NOTE — TELEPHONE ENCOUNTER
Amanda (assistant) phones the office today stating patient tried to make an appointment, although [facility] was booking out until January 2024 and he is having  high blood pressure and was advised to go to the [facility] by our office earlier this morning.     Patient went to the [facility] in [place] and was told he needed to make an appointment with his PCP since he did not have a Cardiologist.     His BP today was 156/110 and last night was 165/101 and is experiencing H/A, sick to his stomach, nausea and vision is disturbed.     Patient was advised to go to ER following the symptoms that were reported.     Thank you.

## 2023-11-13 NOTE — PROGRESS NOTES
Subjective   Patient ID: Héctor Fong is a 48 y.o. male who presents for Hypertension. PT has not taken BP med in over a month.     Felt like BP was high; friends told him his face was red  Bought home machine and pressures were crazy high  Stressed  Fatigue; Always tired  Has a ton of stress at work; self employed    Just restarted blood pressure medication today; Took at 1p  Does not take his Lexapro              Hypertension  The current episode started today. The problem is uncontrolled. Associated symptoms include anxiety, headaches and malaise/fatigue. Pertinent negatives include no palpitations or shortness of breath.        Review of Systems   Constitutional:  Positive for fatigue and malaise/fatigue. Negative for activity change, appetite change, chills and fever.   HENT:  Negative for congestion.    Respiratory:  Negative for cough, chest tightness and shortness of breath.    Cardiovascular:  Negative for palpitations.   Gastrointestinal:  Negative for constipation, diarrhea, nausea and vomiting.   Neurological:  Positive for headaches.   Psychiatric/Behavioral:  Negative for sleep disturbance.        Objective   BP (!) 156/110   Pulse 85   Temp 36.9 °C (98.4 °F)   Resp 18   Wt 77.1 kg (170 lb)   SpO2 98%   BMI 26.63 kg/m²     Physical Exam  Vitals reviewed.   Constitutional:       Appearance: Normal appearance.   HENT:      Head: Normocephalic.   Eyes:      Extraocular Movements: Extraocular movements intact.      Pupils: Pupils are equal, round, and reactive to light.   Cardiovascular:      Rate and Rhythm: Normal rate and regular rhythm.      Pulses: Normal pulses.      Heart sounds: Normal heart sounds.   Pulmonary:      Effort: Pulmonary effort is normal.      Breath sounds: Normal breath sounds.   Musculoskeletal:      Cervical back: Normal range of motion.   Skin:     General: Skin is warm.      Capillary Refill: Capillary refill takes less than 2 seconds.   Neurological:      General: No  focal deficit present.      Mental Status: He is alert and oriented to person, place, and time.   Psychiatric:         Mood and Affect: Mood normal.         Behavior: Behavior normal.         Assessment/Plan   Problem List Items Addressed This Visit             ICD-10-CM    Elevated blood pressure reading - Primary R03.0     Advised to increase lisinopril to 10 mg daily  Can start Lexapro daily   Encouraged to check BP daily at the same time and record  Should follow up with PCP at first available appt  Advised to go to ER with any elevated readings for further management

## 2023-11-17 ENCOUNTER — OFFICE VISIT (OUTPATIENT)
Dept: PRIMARY CARE | Facility: CLINIC | Age: 48
End: 2023-11-17
Payer: COMMERCIAL

## 2023-11-17 VITALS
DIASTOLIC BLOOD PRESSURE: 96 MMHG | BODY MASS INDEX: 27.47 KG/M2 | HEART RATE: 68 BPM | HEIGHT: 67 IN | TEMPERATURE: 97.8 F | OXYGEN SATURATION: 98 % | RESPIRATION RATE: 16 BRPM | SYSTOLIC BLOOD PRESSURE: 164 MMHG | WEIGHT: 175 LBS

## 2023-11-17 DIAGNOSIS — J45.20 MILD INTERMITTENT ASTHMA WITHOUT COMPLICATION (HHS-HCC): ICD-10-CM

## 2023-11-17 DIAGNOSIS — R91.8 MULTIPLE LUNG NODULES ON CT: ICD-10-CM

## 2023-11-17 DIAGNOSIS — Z00.00 PHYSICAL EXAM: ICD-10-CM

## 2023-11-17 DIAGNOSIS — F32.A ANXIETY AND DEPRESSION: ICD-10-CM

## 2023-11-17 DIAGNOSIS — J44.9 CHRONIC OBSTRUCTIVE PULMONARY DISEASE, UNSPECIFIED COPD TYPE (MULTI): Primary | ICD-10-CM

## 2023-11-17 DIAGNOSIS — R73.9 HYPERGLYCEMIA: ICD-10-CM

## 2023-11-17 DIAGNOSIS — F31.12 BIPOLAR 1 DISORDER WITH MODERATE MANIA (MULTI): ICD-10-CM

## 2023-11-17 DIAGNOSIS — I10 HYPERTENSION, UNSPECIFIED TYPE: ICD-10-CM

## 2023-11-17 DIAGNOSIS — F41.9 ANXIETY AND DEPRESSION: ICD-10-CM

## 2023-11-17 DIAGNOSIS — Z12.5 PROSTATE CANCER SCREENING: ICD-10-CM

## 2023-11-17 PROCEDURE — 3077F SYST BP >= 140 MM HG: CPT | Performed by: FAMILY MEDICINE

## 2023-11-17 PROCEDURE — 3080F DIAST BP >= 90 MM HG: CPT | Performed by: FAMILY MEDICINE

## 2023-11-17 PROCEDURE — 99396 PREV VISIT EST AGE 40-64: CPT | Performed by: FAMILY MEDICINE

## 2023-11-17 RX ORDER — AMLODIPINE AND BENAZEPRIL HYDROCHLORIDE 5; 10 MG/1; MG/1
1 CAPSULE ORAL DAILY
Qty: 30 CAPSULE | Refills: 5 | Status: SHIPPED | OUTPATIENT
Start: 2023-11-17 | End: 2024-04-19 | Stop reason: SDUPTHER

## 2023-11-17 NOTE — PROGRESS NOTES
Héctor Fong 48 y.o. male presents today for a physical -check up  And   Chief Complaint   Patient presents with    Blood Pressure Check     Increased lisinopril to 10mg x 4-5 days       Covid vax: x 2  Flu: declined  Shingles: advised     CRC: 5/2023  Very busy and stressed  Gained wt      Patient Active Problem List   Diagnosis    Anxiety and depression    Bipolar 1 disorder with moderate paddy (CMS/HCC)    Hyperglycemia    Hypertension    Mild intermittent asthma without complication    Multiple lung nodules on CT    Elevated blood pressure reading    Chronic obstructive pulmonary disease, unspecified COPD type (CMS/HCC)      Past Surgical History:   Procedure Laterality Date    COLONOSCOPY  05/2023    no polyps-due 2033    WISDOM TOOTH EXTRACTION  1993      Allergies   Allergen Reactions    Bee Venom Protein (Honey Bee) Unknown      Current Outpatient Medications   Medication Sig Dispense Refill    escitalopram (Lexapro) 10 mg tablet Take 1 tablet (10 mg) by mouth once daily.      lisinopril 5 mg tablet Take 1 tablet (5 mg) by mouth once daily.       No current facility-administered medications for this visit.      Social History     Tobacco Use    Smoking status: Former     Types: Cigarettes    Smokeless tobacco: Not on file   Substance Use Topics    Alcohol use: Yes     Comment: social      Bps up at home    The patient reports  No Fever/chills  No Chest pain nor shortness of breath  No Nausea/vomiting/diarrhea  No CVA tenderness  No Confusion   No Paresthesias nor headache  No Urinary issues-frequency or dysuria nor gross hematuria  No new issues with anxiety nor depression, no suicidal nor homicidal ideations  Patient is aware of the controversy over PSA and USPSTF recommendations regarding screening options. He is aware of limitations of this test and will follow with urology if abnormality found.  He is also aware of current recommendation re: colon cancer screening starting at age 45 until 75.  If a  smoker-he is aware of AAA screening guidelines AND CT lung screening recommendations and necessary follow up.     Objective:  Vitals:    11/17/23 1443   BP: (!) 164/96   Pulse:    Resp:    Temp:    SpO2:      General:  alert, oriented, no acute distress.  No obvious skin rashes noted.   No gait disturbance noted.    Mood is pleasant, not tearful, no signs of emotional distress.  Not appearing intoxicated or altered.   No voiced delusions,   Normal, appropriate behavior.    HEENT: Normocephalic, atraumatic,   Pupils round, reactive to light  Extraocular motions intact and wnl  Tympanic membranes normal    Neck: no nuchal rigidity  No masses palpable.  No carotid bruits.  No thyromegaly.    Respiratory: Equal breath sounds  No wheezes,    rales,    nor rhonchi  No respiratory distress.    Heart: Regular rate and rhythm, no    murmurs  no rubs/gallops    Abdomen: no masses palpable, no rebound nor guarding, no rebound nor guarding.    Extremities: NO cyanosis noted, no clubbing.   No edema noted.  2+dorsalis pedis pulses.    Normal-not antalgic, steady gait.      Assessment/Plan    Problem List Items Addressed This Visit       Anxiety and depression    Relevant Orders    Comprehensive Metabolic Panel    CBC and Auto Differential    Hemoglobin A1C    Lipid Panel    Thyroid Stimulating Hormone    Thyroxine, Free    Prostate Specific Antigen, Screen    Bipolar 1 disorder with moderate paddy (CMS/HCC)    Relevant Orders    Comprehensive Metabolic Panel    CBC and Auto Differential    Hemoglobin A1C    Lipid Panel    Thyroid Stimulating Hormone    Thyroxine, Free    Prostate Specific Antigen, Screen    Hyperglycemia    Relevant Orders    Comprehensive Metabolic Panel    CBC and Auto Differential    Hemoglobin A1C    Lipid Panel    Thyroid Stimulating Hormone    Thyroxine, Free    Prostate Specific Antigen, Screen    Hypertension    Relevant Orders    Comprehensive Metabolic Panel    CBC and Auto Differential    Hemoglobin  A1C    Lipid Panel    Thyroid Stimulating Hormone    Thyroxine, Free    Prostate Specific Antigen, Screen    Mild intermittent asthma without complication    Relevant Orders    Comprehensive Metabolic Panel    CBC and Auto Differential    Hemoglobin A1C    Lipid Panel    Thyroid Stimulating Hormone    Thyroxine, Free    Prostate Specific Antigen, Screen    Multiple lung nodules on CT    Relevant Orders    Comprehensive Metabolic Panel    CBC and Auto Differential    Hemoglobin A1C    Lipid Panel    Thyroid Stimulating Hormone    Thyroxine, Free    Prostate Specific Antigen, Screen    Chronic obstructive pulmonary disease, unspecified COPD type (CMS/HCC) - Primary    Relevant Orders    Comprehensive Metabolic Panel    CBC and Auto Differential    Hemoglobin A1C    Lipid Panel    Thyroid Stimulating Hormone    Thyroxine, Free    Prostate Specific Antigen, Screen     Other Visit Diagnoses       Prostate cancer screening        Relevant Orders    Comprehensive Metabolic Panel    CBC and Auto Differential    Hemoglobin A1C    Lipid Panel    Thyroid Stimulating Hormone    Thyroxine, Free    Prostate Specific Antigen, Screen            Patient to follow up at next regularly scheduled visit, health maintenance exams at least yearly-sooner if condition deteriorates or problems arise.    The patient is aware that results of testing should be back in a timely manner-for labs it is usually a few days and no longer than a few weeks. If results are not received (by Mychart, mail, or phone call) patient is aware of the next steps and agrees to call office for results until obtained. If results are not understood by patient, they are aware they can make a virtual or in person visit to discuss.  STOP lisinopril  Tonight take lotrel  This medications risks, benefits, and alternatives were discussed with patient at length.  If any unwanted side effects occur-discontinue medicine and call the office for discussion.  Bp check and review  labs in 4wks  Cardiac ct  Gricelda Vieyra MD

## 2023-11-21 ENCOUNTER — LAB (OUTPATIENT)
Dept: LAB | Facility: LAB | Age: 48
End: 2023-11-21
Payer: COMMERCIAL

## 2023-11-21 DIAGNOSIS — F32.A ANXIETY AND DEPRESSION: ICD-10-CM

## 2023-11-21 DIAGNOSIS — R73.9 HYPERGLYCEMIA: ICD-10-CM

## 2023-11-21 DIAGNOSIS — R91.8 MULTIPLE LUNG NODULES ON CT: ICD-10-CM

## 2023-11-21 DIAGNOSIS — I10 HYPERTENSION, UNSPECIFIED TYPE: ICD-10-CM

## 2023-11-21 DIAGNOSIS — J44.9 CHRONIC OBSTRUCTIVE PULMONARY DISEASE, UNSPECIFIED COPD TYPE (MULTI): ICD-10-CM

## 2023-11-21 DIAGNOSIS — F31.12 BIPOLAR 1 DISORDER WITH MODERATE MANIA (MULTI): ICD-10-CM

## 2023-11-21 DIAGNOSIS — F41.9 ANXIETY AND DEPRESSION: ICD-10-CM

## 2023-11-21 DIAGNOSIS — Z12.5 PROSTATE CANCER SCREENING: ICD-10-CM

## 2023-11-21 DIAGNOSIS — J45.20 MILD INTERMITTENT ASTHMA WITHOUT COMPLICATION (HHS-HCC): ICD-10-CM

## 2023-11-21 LAB
ALBUMIN SERPL BCP-MCNC: 4.5 G/DL (ref 3.4–5)
ALP SERPL-CCNC: 74 U/L (ref 33–120)
ALT SERPL W P-5'-P-CCNC: 26 U/L (ref 10–52)
ANION GAP SERPL CALC-SCNC: 12 MMOL/L (ref 10–20)
AST SERPL W P-5'-P-CCNC: 24 U/L (ref 9–39)
BASOPHILS # BLD AUTO: 0.03 X10*3/UL (ref 0–0.1)
BASOPHILS NFR BLD AUTO: 0.7 %
BILIRUB SERPL-MCNC: 1.1 MG/DL (ref 0–1.2)
BUN SERPL-MCNC: 15 MG/DL (ref 6–23)
CALCIUM SERPL-MCNC: 9.5 MG/DL (ref 8.6–10.3)
CHLORIDE SERPL-SCNC: 104 MMOL/L (ref 98–107)
CHOLEST SERPL-MCNC: 195 MG/DL (ref 0–199)
CHOLESTEROL/HDL RATIO: 2.9
CO2 SERPL-SCNC: 29 MMOL/L (ref 21–32)
CREAT SERPL-MCNC: 0.88 MG/DL (ref 0.5–1.3)
EOSINOPHIL # BLD AUTO: 0.13 X10*3/UL (ref 0–0.7)
EOSINOPHIL NFR BLD AUTO: 2.8 %
ERYTHROCYTE [DISTWIDTH] IN BLOOD BY AUTOMATED COUNT: 11.5 % (ref 11.5–14.5)
EST. AVERAGE GLUCOSE BLD GHB EST-MCNC: 74 MG/DL
GFR SERPL CREATININE-BSD FRML MDRD: >90 ML/MIN/1.73M*2
GLUCOSE SERPL-MCNC: 105 MG/DL (ref 74–99)
HBA1C MFR BLD: 4.2 %
HCT VFR BLD AUTO: 45.1 % (ref 41–52)
HDLC SERPL-MCNC: 67.6 MG/DL
HGB BLD-MCNC: 16.4 G/DL (ref 13.5–17.5)
IMM GRANULOCYTES # BLD AUTO: 0.01 X10*3/UL (ref 0–0.7)
IMM GRANULOCYTES NFR BLD AUTO: 0.2 % (ref 0–0.9)
LDLC SERPL CALC-MCNC: 67 MG/DL
LYMPHOCYTES # BLD AUTO: 1.24 X10*3/UL (ref 1.2–4.8)
LYMPHOCYTES NFR BLD AUTO: 27.1 %
MCH RBC QN AUTO: 33.1 PG (ref 26–34)
MCHC RBC AUTO-ENTMCNC: 36.4 G/DL (ref 32–36)
MCV RBC AUTO: 91 FL (ref 80–100)
MONOCYTES # BLD AUTO: 0.45 X10*3/UL (ref 0.1–1)
MONOCYTES NFR BLD AUTO: 9.8 %
NEUTROPHILS # BLD AUTO: 2.71 X10*3/UL (ref 1.2–7.7)
NEUTROPHILS NFR BLD AUTO: 59.4 %
NON HDL CHOLESTEROL: 127 MG/DL (ref 0–149)
NRBC BLD-RTO: 0 /100 WBCS (ref 0–0)
PLATELET # BLD AUTO: 178 X10*3/UL (ref 150–450)
POTASSIUM SERPL-SCNC: 4.2 MMOL/L (ref 3.5–5.3)
PROT SERPL-MCNC: 7.3 G/DL (ref 6.4–8.2)
PSA SERPL-MCNC: 3.52 NG/ML
RBC # BLD AUTO: 4.96 X10*6/UL (ref 4.5–5.9)
SODIUM SERPL-SCNC: 141 MMOL/L (ref 136–145)
T4 FREE SERPL-MCNC: 0.79 NG/DL (ref 0.61–1.12)
TRIGL SERPL-MCNC: 301 MG/DL (ref 0–149)
TSH SERPL-ACNC: 0.99 MIU/L (ref 0.44–3.98)
VLDL: 60 MG/DL (ref 0–40)
WBC # BLD AUTO: 4.6 X10*3/UL (ref 4.4–11.3)

## 2023-11-21 PROCEDURE — 80061 LIPID PANEL: CPT

## 2023-11-21 PROCEDURE — 80053 COMPREHEN METABOLIC PANEL: CPT

## 2023-11-21 PROCEDURE — 36415 COLL VENOUS BLD VENIPUNCTURE: CPT

## 2023-11-21 PROCEDURE — 85025 COMPLETE CBC W/AUTO DIFF WBC: CPT

## 2023-11-21 PROCEDURE — 84153 ASSAY OF PSA TOTAL: CPT

## 2023-11-21 PROCEDURE — 84439 ASSAY OF FREE THYROXINE: CPT

## 2023-11-21 PROCEDURE — 83036 HEMOGLOBIN GLYCOSYLATED A1C: CPT

## 2023-11-21 PROCEDURE — 84443 ASSAY THYROID STIM HORMONE: CPT

## 2023-11-24 ENCOUNTER — ANCILLARY PROCEDURE (OUTPATIENT)
Dept: RADIOLOGY | Facility: CLINIC | Age: 48
End: 2023-11-24
Payer: COMMERCIAL

## 2023-11-24 DIAGNOSIS — Z00.00 PHYSICAL EXAM: ICD-10-CM

## 2023-11-24 PROCEDURE — 75571 CT HRT W/O DYE W/CA TEST: CPT

## 2023-11-28 DIAGNOSIS — R93.1 ABNORMAL HEART SCORE CT: ICD-10-CM

## 2023-12-08 ENCOUNTER — OFFICE VISIT (OUTPATIENT)
Dept: CARDIOLOGY | Facility: CLINIC | Age: 48
End: 2023-12-08
Payer: COMMERCIAL

## 2023-12-08 VITALS
DIASTOLIC BLOOD PRESSURE: 86 MMHG | HEIGHT: 67 IN | SYSTOLIC BLOOD PRESSURE: 140 MMHG | HEART RATE: 88 BPM | BODY MASS INDEX: 27.78 KG/M2 | WEIGHT: 177 LBS

## 2023-12-08 DIAGNOSIS — J44.9 CHRONIC OBSTRUCTIVE PULMONARY DISEASE, UNSPECIFIED COPD TYPE (MULTI): ICD-10-CM

## 2023-12-08 DIAGNOSIS — R93.1 ABNORMAL HEART SCORE CT: Primary | ICD-10-CM

## 2023-12-08 DIAGNOSIS — I10 PRIMARY HYPERTENSION: ICD-10-CM

## 2023-12-08 PROCEDURE — 3079F DIAST BP 80-89 MM HG: CPT | Performed by: NURSE PRACTITIONER

## 2023-12-08 PROCEDURE — 99215 OFFICE O/P EST HI 40 MIN: CPT | Performed by: NURSE PRACTITIONER

## 2023-12-08 PROCEDURE — 3077F SYST BP >= 140 MM HG: CPT | Performed by: NURSE PRACTITIONER

## 2023-12-08 RX ORDER — ROSUVASTATIN CALCIUM 10 MG/1
10 TABLET, COATED ORAL DAILY
Qty: 30 TABLET | Refills: 6 | Status: SHIPPED | OUTPATIENT
Start: 2023-12-08 | End: 2024-12-07

## 2023-12-08 NOTE — PROGRESS NOTES
Héctor Fong is a 48 y.o. male that presents to the office today for cardiac evaluation, referred by Dr. Vieyra for elevated calcium score.   He  has a PMH of hypertension, COPD, lung nodules, anxiety.  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  He denies tobacco use, vaping, recreational drug use. He does admit to daily ETOH use. He admits to eating take out food on a daily basis.  Denies any family history of coronary artery disease.  He states he used to be very athletic and ran multiple marathons unfortunately he has not run in a few months secondary to plantar fasciitis with plans on resuming running soon.    He denies any chest pain, chest pressure, chest tightness, shortness of breath, palpitations, lightheadedness or dizziness.    Testing Reviewed  11/21/2023 labs; , K4.2, BUN 15, creatinine 0.88, ALT 26, AST 24, cholesterol 195, HDL 67, LDL 67, triglycerides 301.  HgbA1c 4.2, TSH 0.99, free T40.79, Hgb 16, HCT 45.    11/17/2023 CT cardiac scoring; LM 0, LAD 18, LCx 0, RCA 0.  Coronary artery calcium score 18.  [place] 73rd percentile for age gender and race.    EKG obtained in the office today and verified with Dr. Hernandez shows NSR, nonspecific ST and T wave abnormality, HR 85 bpm, QT/Qtc 364/433    Assessment/Plan  1.  Hypertension; recently has been placed on antihypertensive medications by PCP for noted hypertension.  Initial blood pressure in office today elevated at 156/91, reassessment of blood pressure by myself at end of visit 140/86.  Will defer to PCP  2.  Hyperlipidemia; and lieu of recent lipid panel and calcium score will start on statin therapy Crestor 10 mg 1 tablet daily.  3.  Obtain lipid panel and CMP in 6 months  4.  Heart healthy diet   5.  Follow-up in the office with Dr. Gresham in 6 months for review of labs and further recommendations.      Patient Active Problem List   Diagnosis    Anxiety and  "depression    Bipolar 1 disorder with moderate paddy (CMS/HCC)    Hyperglycemia    Hypertension    Mild intermittent asthma without complication    Multiple lung nodules on CT    Elevated blood pressure reading    Chronic obstructive pulmonary disease, unspecified COPD type (CMS/HCC)       Social History     Tobacco Use    Smoking status: Former     Types: Cigarettes   Vaping Use    Vaping Use: Never used   Substance Use Topics    Alcohol use: Yes     Comment: social    Drug use: Never       History reviewed. No pertinent past medical history.      Current Outpatient Medications:     amLODIPine-benazepriL (LotreL) 5-10 mg capsule, Take 1 capsule by mouth once daily., Disp: 30 capsule, Rfl: 5    rosuvastatin (Crestor) 10 mg tablet, Take 1 tablet (10 mg) by mouth once daily., Disp: 30 tablet, Rfl: 6    Bee venom protein (honey bee)    Family History   Problem Relation Name Age of Onset    No Known Problems Mother      Cancer Father          pancreatic       Past Surgical History:   Procedure Laterality Date    COLONOSCOPY  05/2023    no polyps-due 2033    WISDOM TOOTH EXTRACTION  1993          Review of systems  Constitutional: No weight loss, fever, chills, weakness or fatigue  HEENT: No visual loss, blurred vision, double vision or yellow sclerae  Skin: No rash or itching  Cardiovascular: No chest pain, pressure or discomfort, No palpitations or edema.  Respiratory: No shortness of breath, cough or sputum  Gastrointestinal: No nausea, vomiting or diarrhea. No bloody or dark tarry stools.  Neurological: No headache, lightheadedness, dizziness, syncope.   Musculoskeletal: No muscle, back pain, joint pain or stiffness.  Hematologic: No anemia, bleeding or bruising.    /86 (BP Location: Left arm, Patient Position: Sitting)   Pulse 88   Ht 1.702 m (5' 7\")   Wt 80.3 kg (177 lb)   BMI 27.72 kg/m²     Patient Active Problem List   Diagnosis    Anxiety and depression    Bipolar 1 disorder with moderate paddy " (CMS/HCC)    Hyperglycemia    Hypertension    Mild intermittent asthma without complication    Multiple lung nodules on CT    Elevated blood pressure reading    Chronic obstructive pulmonary disease, unspecified COPD type (CMS/HCC)       Physical Exam  Constitutional: Well developed, awake/alert x 3, no distress.  Head/Neck: No JVD, No bruits  Respiratory/Thorax: patent airways, CTAB, normal breath sounds with good expansion.  Cardiovascular: Regular rate and rhythm, no murmurs, normal S1 and S2,   Gastrointestinal: Non distended, soft, non-tender, no rebound tenderness or guarding.  Extremities: No cyanosis, edema.    Neurological: Alert and oriented x 3. Moves extremities spontaneous with purpose.  Psychological: Appropriate mood and behavior  Skin: Warm and Dry. No lesions or rashes.         Please excuse any errors in grammar or translation related to dictation, voice recognition software was used to prepare this document.

## 2023-12-19 ENCOUNTER — OFFICE VISIT (OUTPATIENT)
Dept: PRIMARY CARE | Facility: CLINIC | Age: 48
End: 2023-12-19
Payer: COMMERCIAL

## 2023-12-19 VITALS
RESPIRATION RATE: 16 BRPM | HEIGHT: 67 IN | WEIGHT: 179 LBS | BODY MASS INDEX: 28.09 KG/M2 | TEMPERATURE: 97.3 F | HEART RATE: 71 BPM | DIASTOLIC BLOOD PRESSURE: 76 MMHG | OXYGEN SATURATION: 97 % | SYSTOLIC BLOOD PRESSURE: 128 MMHG

## 2023-12-19 DIAGNOSIS — F31.12 BIPOLAR 1 DISORDER WITH MODERATE MANIA (MULTI): ICD-10-CM

## 2023-12-19 DIAGNOSIS — Z23 NEED FOR VACCINATION: ICD-10-CM

## 2023-12-19 DIAGNOSIS — I10 PRIMARY HYPERTENSION: ICD-10-CM

## 2023-12-19 DIAGNOSIS — F41.9 ANXIETY AND DEPRESSION: ICD-10-CM

## 2023-12-19 DIAGNOSIS — F32.A ANXIETY AND DEPRESSION: ICD-10-CM

## 2023-12-19 DIAGNOSIS — E78.1 HYPERTRIGLYCERIDEMIA: ICD-10-CM

## 2023-12-19 DIAGNOSIS — M72.2 PLANTAR FASCIITIS: Primary | ICD-10-CM

## 2023-12-19 PROBLEM — R03.0 ELEVATED BLOOD PRESSURE READING: Status: RESOLVED | Noted: 2023-11-13 | Resolved: 2023-12-19

## 2023-12-19 PROCEDURE — 3078F DIAST BP <80 MM HG: CPT | Performed by: FAMILY MEDICINE

## 2023-12-19 PROCEDURE — 3074F SYST BP LT 130 MM HG: CPT | Performed by: FAMILY MEDICINE

## 2023-12-19 PROCEDURE — 90686 IIV4 VACC NO PRSV 0.5 ML IM: CPT | Performed by: FAMILY MEDICINE

## 2023-12-19 PROCEDURE — 90471 IMMUNIZATION ADMIN: CPT | Performed by: FAMILY MEDICINE

## 2023-12-19 PROCEDURE — 99214 OFFICE O/P EST MOD 30 MIN: CPT | Performed by: FAMILY MEDICINE

## 2023-12-19 RX ORDER — ESCITALOPRAM OXALATE 20 MG/1
20 TABLET ORAL DAILY
Qty: 30 TABLET | Refills: 5 | Status: SHIPPED | OUTPATIENT
Start: 2023-12-19 | End: 2024-06-16

## 2023-12-19 NOTE — PROGRESS NOTES
"Subjective   Patient ID: Héctor Fong is a 48 y.o. male who presents for Blood Pressure Check (Now on lotrel 5/10mg).  Covid vax: x 2  Flu: UTD  Shingles: advised     CRC: due 2033   takes bp med  Never refilled ssri  Needs to be on regularly  Not seeing Soni lately      HPI  Patient Active Problem List   Diagnosis    Anxiety and depression    Bipolar 1 disorder with moderate paddy (CMS/HCC)    Hyperglycemia    Hypertension    Mild intermittent asthma without complication    Multiple lung nodules on CT    Chronic obstructive pulmonary disease, unspecified COPD type (CMS/HCC)    Hypertriglyceridemia       Past Surgical History:   Procedure Laterality Date    COLONOSCOPY  05/2023    no polyps-due 2033    WISDOM TOOTH EXTRACTION  1993       Review of Systems no sz mi or cva    This patient has   NO history of recent Covid nor flu symptoms,  NO Fever nor chills,  NO Chest pain, shortness of breath nor paroxysmal nocturnal dyspnea,  NO Nausea, vomiting, nor diarrhea,  NO Hematochezia nor melena,  NO Dysuria, hematuria, nor new incontinence issues  NO new severe headaches nor neurological complaints,  NO new issues with anxiety nor depression nor new psychiatric complaints,  NO suicidal nor homicidal ideations.     OBJECTIVE:  /76   Pulse 71   Temp 36.3 °C (97.3 °F) (Temporal)   Resp 16   Ht 1.702 m (5' 7\")   Wt 81.2 kg (179 lb)   SpO2 97%   BMI 28.04 kg/m²      General:  alert, oriented, no acute distress. Still anxious    No obvious skin rashes noted.   No gait disturbance noted.    Mood is pleasant, not tearful, no signs of emotional distress.  Not appearing intoxicated or altered.   No voiced delusions,   Normal, appropriate behavior.    HEENT: Normocephalic, atraumatic,   Pupils round, reactive to light  Extraocular motions intact and wnl  Tympanic membranes normal    Neck: no nuchal rigidity  No masses palpable.  No carotid bruits.  No thyromegaly.    Respiratory: Equal breath sounds  No wheezes,    " rales,    nor rhonchi  No respiratory distress.    Heart: Regular rate and rhythm, no    murmurs  no rubs/gallops    Abdomen: no masses palpable, nontender, no rebound nor guarding.    Extremities: NO cyanosis noted, no clubbing.   No edema noted.  2+dorsalis pedis pulses.    Normal-not antalgic, steady gait.    Lab on 11/21/2023   Component Date Value Ref Range Status    Glucose 11/21/2023 105 (H)  74 - 99 mg/dL Final    Sodium 11/21/2023 141  136 - 145 mmol/L Final    Potassium 11/21/2023 4.2  3.5 - 5.3 mmol/L Final    Chloride 11/21/2023 104  98 - 107 mmol/L Final    Bicarbonate 11/21/2023 29  21 - 32 mmol/L Final    Anion Gap 11/21/2023 12  10 - 20 mmol/L Final    Urea Nitrogen 11/21/2023 15  6 - 23 mg/dL Final    Creatinine 11/21/2023 0.88  0.50 - 1.30 mg/dL Final    eGFR 11/21/2023 >90  >60 mL/min/1.73m*2 Final    Calculations of estimated GFR are performed using the 2021 CKD-EPI Study Refit equation without the race variable for the IDMS-Traceable creatinine methods.  https://jasn.asnjournals.org/content/early/2021/09/22/ASN.9601142765    Calcium 11/21/2023 9.5  8.6 - 10.3 mg/dL Final    Albumin 11/21/2023 4.5  3.4 - 5.0 g/dL Final    Alkaline Phosphatase 11/21/2023 74  33 - 120 U/L Final    Total Protein 11/21/2023 7.3  6.4 - 8.2 g/dL Final    AST 11/21/2023 24  9 - 39 U/L Final    Bilirubin, Total 11/21/2023 1.1  0.0 - 1.2 mg/dL Final    ALT 11/21/2023 26  10 - 52 U/L Final    Patients treated with Sulfasalazine may generate falsely decreased results for ALT.    WBC 11/21/2023 4.6  4.4 - 11.3 x10*3/uL Final    nRBC 11/21/2023 0.0  0.0 - 0.0 /100 WBCs Final    RBC 11/21/2023 4.96  4.50 - 5.90 x10*6/uL Final    Hemoglobin 11/21/2023 16.4  13.5 - 17.5 g/dL Final    Hematocrit 11/21/2023 45.1  41.0 - 52.0 % Final    MCV 11/21/2023 91  80 - 100 fL Final    MCH 11/21/2023 33.1  26.0 - 34.0 pg Final    MCHC 11/21/2023 36.4 (H)  32.0 - 36.0 g/dL Final    RDW 11/21/2023 11.5  11.5 - 14.5 % Final    Platelets  11/21/2023 178  150 - 450 x10*3/uL Final    Neutrophils % 11/21/2023 59.4  40.0 - 80.0 % Final    Immature Granulocytes %, Automated 11/21/2023 0.2  0.0 - 0.9 % Final    Immature Granulocyte Count (IG) includes promyelocytes, myelocytes and metamyelocytes but does not include bands. Percent differential counts (%) should be interpreted in the context of the absolute cell counts (cells/UL).    Lymphocytes % 11/21/2023 27.1  13.0 - 44.0 % Final    Monocytes % 11/21/2023 9.8  2.0 - 10.0 % Final    Eosinophils % 11/21/2023 2.8  0.0 - 6.0 % Final    Basophils % 11/21/2023 0.7  0.0 - 2.0 % Final    Neutrophils Absolute 11/21/2023 2.71  1.20 - 7.70 x10*3/uL Final    Percent differential counts (%) should be interpreted in the context of the absolute cell counts (cells/uL).    Immature Granulocytes Absolute, Au* 11/21/2023 0.01  0.00 - 0.70 x10*3/uL Final    Lymphocytes Absolute 11/21/2023 1.24  1.20 - 4.80 x10*3/uL Final    Monocytes Absolute 11/21/2023 0.45  0.10 - 1.00 x10*3/uL Final    Eosinophils Absolute 11/21/2023 0.13  0.00 - 0.70 x10*3/uL Final    Basophils Absolute 11/21/2023 0.03  0.00 - 0.10 x10*3/uL Final    Hemoglobin A1C 11/21/2023 4.2  see below % Final    Estimated Average Glucose 11/21/2023 74  Not Established mg/dL Final    Cholesterol 11/21/2023 195  0 - 199 mg/dL Final          Age      Desirable   Borderline High   High     0-19 Y     0 - 169       170 - 199     >/= 200    20-24 Y     0 - 189       190 - 224     >/= 225         >24 Y     0 - 199       200 - 239     >/= 240   **All ranges are based on fasting samples. Specific   therapeutic targets will vary based on patient-specific   cardiac risk.    Pediatric guidelines reference:Pediatrics 2011, 128(S5).Adult guidelines reference: NCEP ATPIII Guidelines,AUDI 2001, 258:2486-97    Venipuncture immediately after or during the administration of Metamizole may lead to falsely low results. Testing should be performed immediately prior to Metamizole  dosing.    HDL-Cholesterol 11/21/2023 67.6  mg/dL Final      Age       Very Low   Low     Normal    High    0-19 Y    < 35      < 40     40-45     ----  20-24 Y    ----     < 40      >45      ----        >24 Y      ----     < 40     40-60      >60      Cholesterol/HDL Ratio 11/21/2023 2.9   Final      Ref Values  Desirable  < 3.4  High Risk  > 5.0    LDL Calculated 11/21/2023 67  <=99 mg/dL Final                                Near   Borderline      AGE      Desirable  Optimal    High     High     Very High     0-19 Y     0 - 109     ---    110-129   >/= 130     ----    20-24 Y     0 - 119     ---    120-159   >/= 160     ----      >24 Y     0 -  99   100-129  130-159   160-189     >/=190      VLDL 11/21/2023 60 (H)  0 - 40 mg/dL Final    Triglycerides 11/21/2023 301 (H)  0 - 149 mg/dL Final       Age         Desirable   Borderline High   High     Very High   0 D-90 D    19 - 174         ----         ----        ----  91 D- 9 Y     0 -  74        75 -  99     >/= 100      ----    10-19 Y     0 -  89        90 - 129     >/= 130      ----    20-24 Y     0 - 114       115 - 149     >/= 150      ----         >24 Y     0 - 149       150 - 199    200- 499    >/= 500    Venipuncture immediately after or during the administration of Metamizole may lead to falsely low results. Testing should be performed immediately prior to Metamizole dosing.    Non HDL Cholesterol 11/21/2023 127  0 - 149 mg/dL Final          Age       Desirable   Borderline High   High     Very High     0-19 Y     0 - 119       120 - 144     >/= 145    >/= 160    20-24 Y     0 - 149       150 - 189     >/= 190      ----         >24 Y    30 mg/dL above LDL Cholesterol goal      Thyroid Stimulating Hormone 11/21/2023 0.99  0.44 - 3.98 mIU/L Final    Thyroxine, Free 11/21/2023 0.79  0.61 - 1.12 ng/dL Final    Prostate Specific Antigen,Screen 11/21/2023 3.52  <=4.00 ng/mL Final        Assessment/Plan     Problem List Items Addressed This Visit       Anxiety  and depression    Relevant Medications    escitalopram (Lexapro) 20 mg tablet    Bipolar 1 disorder with moderate paddy (CMS/HCC)    Relevant Medications    escitalopram (Lexapro) 20 mg tablet    Hypertension    Hypertriglyceridemia     Other Visit Diagnoses       Need for vaccination        Relevant Orders    Flu vaccine (IIV4) age 6 months and greater, preservative free (Completed)        On statin now  Labs in 6mo  Saw dr almonte  Refill lexapro  This medications risks, benefits, and alternatives were discussed with patient at length.  If any unwanted side effects occur-discontinue medicine and call the office for discussion.  Advised return to counseling  20mg -advised 1/2x1mo  Dr felix suggested may need more    No hi/si  Not in crisis at all-has plan if occurs  See me 3-6mo  Cut caffeine  Reqs PT for fasciitis

## 2024-03-13 ENCOUNTER — APPOINTMENT (OUTPATIENT)
Dept: PHYSICAL THERAPY | Facility: CLINIC | Age: 49
End: 2024-03-13
Payer: COMMERCIAL

## 2024-04-19 ENCOUNTER — OFFICE VISIT (OUTPATIENT)
Dept: PRIMARY CARE | Facility: CLINIC | Age: 49
End: 2024-04-19
Payer: COMMERCIAL

## 2024-04-19 VITALS
OXYGEN SATURATION: 97 % | HEART RATE: 72 BPM | BODY MASS INDEX: 26.68 KG/M2 | TEMPERATURE: 97.1 F | RESPIRATION RATE: 16 BRPM | DIASTOLIC BLOOD PRESSURE: 80 MMHG | HEIGHT: 67 IN | SYSTOLIC BLOOD PRESSURE: 136 MMHG | WEIGHT: 170 LBS

## 2024-04-19 DIAGNOSIS — M72.2 PLANTAR FASCIITIS: Primary | ICD-10-CM

## 2024-04-19 DIAGNOSIS — J44.9 CHRONIC OBSTRUCTIVE PULMONARY DISEASE, UNSPECIFIED COPD TYPE (MULTI): ICD-10-CM

## 2024-04-19 DIAGNOSIS — I10 HYPERTENSION, UNSPECIFIED TYPE: ICD-10-CM

## 2024-04-19 DIAGNOSIS — F31.12 BIPOLAR 1 DISORDER WITH MODERATE MANIA (MULTI): ICD-10-CM

## 2024-04-19 DIAGNOSIS — R73.9 HYPERGLYCEMIA: ICD-10-CM

## 2024-04-19 DIAGNOSIS — I10 PRIMARY HYPERTENSION: ICD-10-CM

## 2024-04-19 DIAGNOSIS — R91.8 MULTIPLE LUNG NODULES ON CT: ICD-10-CM

## 2024-04-19 DIAGNOSIS — Z23 NEED FOR VACCINATION: ICD-10-CM

## 2024-04-19 PROCEDURE — 3079F DIAST BP 80-89 MM HG: CPT | Performed by: FAMILY MEDICINE

## 2024-04-19 PROCEDURE — 90750 HZV VACC RECOMBINANT IM: CPT | Performed by: FAMILY MEDICINE

## 2024-04-19 PROCEDURE — 99214 OFFICE O/P EST MOD 30 MIN: CPT | Performed by: FAMILY MEDICINE

## 2024-04-19 PROCEDURE — 90471 IMMUNIZATION ADMIN: CPT | Performed by: FAMILY MEDICINE

## 2024-04-19 PROCEDURE — 3075F SYST BP GE 130 - 139MM HG: CPT | Performed by: FAMILY MEDICINE

## 2024-04-19 RX ORDER — AMLODIPINE AND BENAZEPRIL HYDROCHLORIDE 5; 10 MG/1; MG/1
1 CAPSULE ORAL DAILY
Qty: 30 CAPSULE | Refills: 5 | Status: SHIPPED | OUTPATIENT
Start: 2024-04-19 | End: 2025-04-19

## 2024-04-19 NOTE — PROGRESS NOTES
Héctor Fong 48 y.o. male presents today for a physical -check up  And   Chief Complaint   Patient presents with    Hypertension    Anxiety       Covid vax: x 2  Flu: UTD  Tdap: UTD  Shingles: 1st dose given today     CRC: due 2033       Patient Active Problem List   Diagnosis    Anxiety and depression    Bipolar 1 disorder with moderate paddy (Multi)    Hyperglycemia    Hypertension    Mild intermittent asthma without complication (HHS-HCC)    Multiple lung nodules on CT    Chronic obstructive pulmonary disease, unspecified COPD type (Multi)    Hypertriglyceridemia      Past Surgical History:   Procedure Laterality Date    COLONOSCOPY  05/2023    no polyps-due 2033    WISDOM TOOTH EXTRACTION  1993      Allergies   Allergen Reactions    Bee Venom Protein (Honey Bee) Unknown      Current Outpatient Medications   Medication Sig Dispense Refill    amLODIPine-benazepriL (LotreL) 5-10 mg capsule Take 1 capsule by mouth once daily. 30 capsule 5    escitalopram (Lexapro) 20 mg tablet Take 1 tablet (20 mg) by mouth once daily. 30 tablet 5    rosuvastatin (Crestor) 10 mg tablet Take 1 tablet (10 mg) by mouth once daily. 30 tablet 6     No current facility-administered medications for this visit.      Social History     Tobacco Use    Smoking status: Former     Types: Cigarettes    Smokeless tobacco: Not on file   Substance Use Topics    Alcohol use: Yes     Comment: social        The patient reports  No Fever/chills  No Chest pain nor shortness of breath  No Nausea/vomiting/diarrhea  No CVA tenderness  No Confusion   No Paresthesias nor headache  No Urinary issues-frequency or dysuria nor gross hematuria  No new issues with anxiety nor depression, no suicidal nor homicidal ideations  Patient is aware of the controversy over PSA and USPSTF recommendations regarding screening options. He is aware of limitations of this test and will follow with urology if abnormality found.  He is also aware of current recommendation re:  colon cancer screening starting at age 45 until 75.  If a smoker-he is aware of AAA screening guidelines AND CT lung screening recommendations and necessary follow up.     Objective:  Vitals:    04/19/24 1505   BP: 136/80   Pulse:    Resp:    Temp:    SpO2:      General:  alert, oriented, no acute distress.  No obvious skin rashes noted.   No gait disturbance noted.    Mood is pleasant, not tearful, no signs of emotional distress.  Not appearing intoxicated or altered.   No voiced delusions,   Normal, appropriate behavior.    HEENT: Normocephalic, atraumatic,   Pupils round, reactive to light  Extraocular motions intact and wnl  Tympanic membranes normal    Neck: no nuchal rigidity  No masses palpable.  No carotid bruits.  No thyromegaly.    Respiratory: Equal breath sounds  No wheezes,    rales,    nor rhonchi  No respiratory distress.    Heart: Regular rate and rhythm, no    murmurs  no rubs/gallops    Abdomen: no masses palpable, no rebound nor guarding, no rebound nor guarding.    Extremities: NO cyanosis noted, no clubbing.   No edema noted.  2+dorsalis pedis pulses.    Normal-not antalgic, steady gait.      Assessment/Plan    Problem List Items Addressed This Visit       Bipolar 1 disorder with moderate paddy (Multi)    Hyperglycemia    Hypertension    Relevant Medications    amLODIPine-benazepriL (LotreL) 5-10 mg capsule    Multiple lung nodules on CT    Chronic obstructive pulmonary disease, unspecified COPD type (Multi)     Other Visit Diagnoses       Plantar fasciitis    -  Primary    Need for vaccination        Relevant Orders    Zoster vaccine, recombinant, adult (SHINGRIX)    Follow Up In [facility] - PCP - Nurse Visit          Still running working a lot  [employer] pods  Helped fasciitis  Shot and orthotics    Mood wise-more stable  Still some discord  No hi/si  Labs due end of year  Very low-not 0-ccs  Saw cardio seeing again in June      Patient to follow up at next regularly scheduled visit,  health maintenance exams at least yearly-sooner if condition deteriorates or problems arise.  Will check bp at home    The patient is aware that results of testing should be back in a timely manner-for labs it is usually a few days and no longer than a few weeks. If results are not received (by Mychart, mail, or phone call) patient is aware of the next steps and agrees to call office for results until obtained. If results are not understood by patient, they are aware they can make a virtual or in person visit to discuss.    Gricelda Vieyra MD

## 2024-06-07 ENCOUNTER — OFFICE VISIT (OUTPATIENT)
Dept: CARDIOLOGY | Facility: CLINIC | Age: 49
End: 2024-06-07
Payer: COMMERCIAL

## 2024-06-07 VITALS
DIASTOLIC BLOOD PRESSURE: 70 MMHG | HEART RATE: 75 BPM | SYSTOLIC BLOOD PRESSURE: 120 MMHG | BODY MASS INDEX: 27 KG/M2 | HEIGHT: 67 IN | WEIGHT: 172 LBS

## 2024-06-07 DIAGNOSIS — R91.8 MULTIPLE LUNG NODULES ON CT: ICD-10-CM

## 2024-06-07 DIAGNOSIS — R73.9 HYPERGLYCEMIA: ICD-10-CM

## 2024-06-07 DIAGNOSIS — J44.9 CHRONIC OBSTRUCTIVE PULMONARY DISEASE, UNSPECIFIED COPD TYPE (MULTI): ICD-10-CM

## 2024-06-07 DIAGNOSIS — I10 PRIMARY HYPERTENSION: ICD-10-CM

## 2024-06-07 DIAGNOSIS — Z87.891 FORMER SMOKER: ICD-10-CM

## 2024-06-07 DIAGNOSIS — F31.12 BIPOLAR 1 DISORDER WITH MODERATE MANIA (MULTI): ICD-10-CM

## 2024-06-07 DIAGNOSIS — J45.20 MILD INTERMITTENT ASTHMA WITHOUT COMPLICATION (HHS-HCC): ICD-10-CM

## 2024-06-07 DIAGNOSIS — R93.1 ABNORMAL HEART SCORE CT: ICD-10-CM

## 2024-06-07 DIAGNOSIS — F10.10 EXCESSIVE DRINKING ALCOHOL: ICD-10-CM

## 2024-06-07 DIAGNOSIS — F32.A ANXIETY AND DEPRESSION: Primary | ICD-10-CM

## 2024-06-07 DIAGNOSIS — F41.9 ANXIETY AND DEPRESSION: Primary | ICD-10-CM

## 2024-06-07 DIAGNOSIS — E78.1 HYPERTRIGLYCERIDEMIA: ICD-10-CM

## 2024-06-07 PROCEDURE — 99215 OFFICE O/P EST HI 40 MIN: CPT | Performed by: INTERNAL MEDICINE

## 2024-06-07 PROCEDURE — 3074F SYST BP LT 130 MM HG: CPT | Performed by: INTERNAL MEDICINE

## 2024-06-07 PROCEDURE — 93000 ELECTROCARDIOGRAM COMPLETE: CPT | Performed by: INTERNAL MEDICINE

## 2024-06-07 PROCEDURE — 3078F DIAST BP <80 MM HG: CPT | Performed by: INTERNAL MEDICINE

## 2024-06-07 NOTE — PATIENT INSTRUCTIONS
Continue same medications and treatments.     Please bring any lab results from other providers / physicians to your next appointment.     Please bring all medicines, vitamins, and herbal supplements with you when you come to the office.     Prescriptions will not be filled unless you are compliant with your follow up appointments or have a follow up appointment scheduled as per instruction of your physician. Refills should be requested at the time of your visit.    Scribe Attestation  By signing my name below, IHarini Scribe   attest that this documentation has been prepared under the direction and in the presence of Amanuel Roldan MD.

## 2024-06-07 NOTE — PROGRESS NOTES
CARDIOLOGY CONSULTATION NOTE       Patient:    Héctor Fong    YOB: 1975  MRN:    [id number]    Date:   6/7/2024    Primary Physician: Gricelda Vieyra MD     Reason for Visit: Initial cardiovascular evaluation/consultation for CAD, hypertension.     IMPRESSION:      Hypertension  Coronary artery disease with positive CT calcium score 11/2023, 18, 73% Tracy.  Abnormal rest electrocardiogram  Hyperlipidemia  Hyperglycemia  Asthma  COPD  Anxiety  Depression   Bipolar disorder / depression history  Of pulmonary nodules by CT  Past smoker  Alcohol excess  Otherwise as per assessment below.    RECOMMENDATIONS:      The patient has above-noted history and findings of.  He does have abnormal CT calcium scoring, and his EKG is slightly abnormal and he has significant cardiovascular risk factors therefore would suggest the following cardiovascular evaluation: Exercise treadmill stress test, echocardiogram and repeat laboratory studies.  Further recommendation will rendered following.    He will continue his current medications as he appears to be well-controlled for his blood pressure.    Would suggest continue exercise and dietary program.    Hydration.    [employer] portal use was encouraged.    We will plan to see back in 4 months with the above testing, laboratory Studies and ECG as noted.     Patient will follow up with their primary physician for general care.    The patient knows to contact medical care earlier if need be.    HPI:     Héctor Fong was seen in cardiac evaluation at the  Cardiology office June 7, 2024.      The patients problems are listed as in the impression above.    Electronic medical records reviewed.    Pleasant 48-year-old hypertensive, hyperlipidemic, hyperglycemic gentleman with known coronary artery disease by CT calcium scoring, COPD and asthma who has had poorly controlled hypertension.  Patient saw Dr. Vieyra and nurse practitioner, Chica Patel, in the past.  Records  were reviewed in detail.  I was asked to see in cardiology consultation for further ongoing evaluation.    Patient denies Chest Pain, SOB, Lightheadedness, Dizziness, TIA or CVA symptoms.  No CHF or Edema.  No Palpitations.  No GI,  or Bleeding Issues. No Recent Fever or Chills.     Cardiovascular and general review of systems is otherwise negative.    A 14-system review is otherwise negative, other than noted.    ALLERGIES:     Allergies   Allergen Reactions    Bee Venom Protein (Honey Bee) Unknown        MEDICATIONS:     Current Outpatient Medications   Medication Instructions    amLODIPine-benazepriL (LotreL) 5-10 mg capsule 1 capsule, oral, Daily    escitalopram (LEXAPRO) 20 mg, oral, Daily    rosuvastatin (CRESTOR) 10 mg, oral, Daily       PAST MEDICAL HISTORY:   As per impression above.  No other significant past medical or surgical history appreciated.    SOCIAL HISTORY:   , adult children.  .  Past tobacco use.    Moderate alcohol use 4 vodkas per day.    No illicit drug use.    FAMILY HISTORY:   Negative family history of CAD    VITALS:     Vitals:    06/07/24 0950   BP: 120/70   Pulse: 75       Wt Readings from Last 4 Encounters:   06/07/24 78 kg (172 lb)   04/19/24 77.1 kg (170 lb)   12/19/23 81.2 kg (179 lb)   12/08/23 80.3 kg (177 lb)       PHYSICAL EXAMINATION:      General: No acute distress. Vital signs as noted. Alert and oriented.  Head And Neck Examination: No jugular venous distention, no carotid bruits, no mass. Carotid upstrokes preserved. Oral mucosa moist.  No xanthelasma. Head and neck examination otherwise unremarkable.  Lungs: Clear to auscultation and percussion. No wheezes, no rales,  and no rhonchi.  Chest: Excursion appeared to be normal. No chest wall tenderness on palpation.  Heart: Normal S1 and S2. No S3. No S4. No rub. Grade 1/6 systolic murmur, best heard at the left sternal border. Point of maximal impulse was within normal limits.  Abdomen: Soft.  Nontender. No organomegaly. No bruits. No masses. Obese.  Extremities: No bipedal edema. No clubbing. No cyanosis.  Pulses are strong throughout. No bruits.  Musculoskeletal Exam: No ulcers, otherwise unremarkable.  Neuro: Neurologically appeared grossly intact.    ELECTROCARDIOGRAM:      Sinus rhythm, nonspecific x-ray changes.  Rate 71.    CARDIAC TESTING:      None this visit    LABORATORY DATA:      CBC:   Lab Results   Component Value Date    WBC 4.6 11/21/2023    RBC 4.96 11/21/2023    HGB 16.4 11/21/2023    HCT 45.1 11/21/2023     11/21/2023        CMP:    Lab Results   Component Value Date     11/21/2023    K 4.2 11/21/2023     11/21/2023    CO2 29 11/21/2023    BUN 15 11/21/2023    CREATININE 0.88 11/21/2023    GLUCOSE 105 (H) 11/21/2023    CALCIUM 9.5 11/21/2023         Lipid Profile:    Lab Results   Component Value Date    CHOL 195 11/21/2023    TRIG 301 (H) 11/21/2023    HDL 67.6 11/21/2023    LDLF 91 10/12/2022       Hepatic Function Panel:    Lab Results   Component Value Date    ALKPHOS 74 11/21/2023    ALT 26 11/21/2023    AST 24 11/21/2023    PROT 7.3 11/21/2023    BILITOT 1.1 11/21/2023       TSH:    Lab Results   Component Value Date    TSH 0.99 11/21/2023       HgBA1c:    Lab Results   Component Value Date    HGBA1C 4.2 11/21/2023         RADIOLOGY:     Transthoracic Echo (TTE) Complete    (Results Pending)   Stress Test    (Results Pending)                   PROBLEM LIST:     Patient Active Problem List   Diagnosis    Anxiety and depression    Bipolar 1 disorder with moderate paddy (Multi)    Hyperglycemia    Hypertension    Mild intermittent asthma without complication (HHS-HCC)    Multiple lung nodules on CT    Chronic obstructive pulmonary disease, unspecified COPD type (Multi)    Hypertriglyceridemia    Abnormal Heart Score CT    Excessive drinking alcohol    Former smoker             Amanuel Roldan MD, FACC   [facility] / [facility] /  Cardiology      Of Note:  Dragon voice  recognition dictation software was utilized partially in the preparation of this note, therefore, inaccuracies in spelling, word choice and punctuation may have occurred which were not recognized the time of signing.    Patient was seen and examined with total time of visit including chart preparation, rooming, and chart completion exceeding 40 minutes.    ----

## 2024-07-10 ENCOUNTER — TELEPHONE (OUTPATIENT)
Dept: PRIMARY CARE | Facility: CLINIC | Age: 49
End: 2024-07-10
Payer: COMMERCIAL

## 2024-07-10 DIAGNOSIS — F41.9 ANXIETY AND DEPRESSION: Primary | ICD-10-CM

## 2024-07-10 DIAGNOSIS — F32.A ANXIETY AND DEPRESSION: Primary | ICD-10-CM

## 2024-07-10 RX ORDER — ESCITALOPRAM OXALATE 20 MG/1
20 TABLET ORAL DAILY
Qty: 90 TABLET | Refills: 1 | Status: SHIPPED | OUTPATIENT
Start: 2024-07-10 | End: 2025-01-06

## 2024-07-10 NOTE — TELEPHONE ENCOUNTER
Dr Vieyra pt requesting refill, Dr Meadows cx'd his script out at pt's appt  Lexapro 20mg, 1 tab daily  DM [place]  Pt's # [phone]

## 2024-09-11 ENCOUNTER — ANCILLARY PROCEDURE (OUTPATIENT)
Dept: CARDIOLOGY | Facility: HOSPITAL | Age: 49
End: 2024-09-11
Payer: COMMERCIAL

## 2024-09-11 ENCOUNTER — CLINICAL SUPPORT (OUTPATIENT)
Dept: CARDIOLOGY | Facility: HOSPITAL | Age: 49
End: 2024-09-11
Payer: COMMERCIAL

## 2024-09-11 DIAGNOSIS — R93.1 ABNORMAL HEART SCORE CT: ICD-10-CM

## 2024-09-11 DIAGNOSIS — E78.1 HYPERTRIGLYCERIDEMIA: ICD-10-CM

## 2024-09-11 DIAGNOSIS — F41.9 ANXIETY AND DEPRESSION: ICD-10-CM

## 2024-09-11 DIAGNOSIS — F32.A ANXIETY AND DEPRESSION: ICD-10-CM

## 2024-09-11 DIAGNOSIS — F10.10 EXCESSIVE DRINKING ALCOHOL: ICD-10-CM

## 2024-09-11 DIAGNOSIS — I10 PRIMARY HYPERTENSION: ICD-10-CM

## 2024-09-11 DIAGNOSIS — J44.9 CHRONIC OBSTRUCTIVE PULMONARY DISEASE, UNSPECIFIED COPD TYPE (MULTI): ICD-10-CM

## 2024-09-11 DIAGNOSIS — J45.20 MILD INTERMITTENT ASTHMA WITHOUT COMPLICATION (HHS-HCC): ICD-10-CM

## 2024-09-11 DIAGNOSIS — R91.8 MULTIPLE LUNG NODULES ON CT: ICD-10-CM

## 2024-09-11 DIAGNOSIS — F31.12 BIPOLAR 1 DISORDER WITH MODERATE MANIA (MULTI): ICD-10-CM

## 2024-09-11 DIAGNOSIS — R73.9 HYPERGLYCEMIA: ICD-10-CM

## 2024-09-11 DIAGNOSIS — Z87.891 FORMER SMOKER: ICD-10-CM

## 2024-09-11 DIAGNOSIS — E78.5 HYPERLIPIDEMIA, UNSPECIFIED: ICD-10-CM

## 2024-09-11 LAB
AORTIC VALVE MEAN GRADIENT: 4 MMHG
AORTIC VALVE PEAK VELOCITY: 1.39 M/S
AV PEAK GRADIENT: 7.7 MMHG
AVA (PEAK VEL): 3.06 CM2
AVA (VTI): 3.01 CM2
EJECTION FRACTION APICAL 4 CHAMBER: 63.8
EJECTION FRACTION: 63 %
LEFT VENTRICLE INTERNAL DIMENSION DIASTOLE: 4.96 CM (ref 3.5–6)
LEFT VENTRICULAR OUTFLOW TRACT DIAMETER: 2.2 CM
LV EJECTION FRACTION BIPLANE: 60 %
MITRAL VALVE E/A RATIO: 1.33
RIGHT VENTRICLE PEAK SYSTOLIC PRESSURE: 28.4 MMHG

## 2024-09-11 PROCEDURE — 93306 TTE W/DOPPLER COMPLETE: CPT

## 2024-09-11 PROCEDURE — 93018 CV STRESS TEST I&R ONLY: CPT | Performed by: INTERNAL MEDICINE

## 2024-09-11 PROCEDURE — 93017 CV STRESS TEST TRACING ONLY: CPT

## 2024-09-11 PROCEDURE — 93016 CV STRESS TEST SUPVJ ONLY: CPT | Performed by: INTERNAL MEDICINE

## 2024-09-11 PROCEDURE — 93306 TTE W/DOPPLER COMPLETE: CPT | Performed by: INTERNAL MEDICINE

## 2024-09-20 ENCOUNTER — APPOINTMENT (OUTPATIENT)
Dept: CARDIOLOGY | Facility: CLINIC | Age: 49
End: 2024-09-20
Payer: COMMERCIAL

## 2024-09-20 VITALS
BODY MASS INDEX: 26.71 KG/M2 | DIASTOLIC BLOOD PRESSURE: 82 MMHG | WEIGHT: 170.2 LBS | HEART RATE: 72 BPM | SYSTOLIC BLOOD PRESSURE: 142 MMHG | HEIGHT: 67 IN

## 2024-09-20 DIAGNOSIS — F10.10 EXCESSIVE DRINKING ALCOHOL: ICD-10-CM

## 2024-09-20 DIAGNOSIS — E78.1 HYPERTRIGLYCERIDEMIA: ICD-10-CM

## 2024-09-20 DIAGNOSIS — F32.A ANXIETY AND DEPRESSION: ICD-10-CM

## 2024-09-20 DIAGNOSIS — F31.12 BIPOLAR 1 DISORDER WITH MODERATE MANIA (MULTI): ICD-10-CM

## 2024-09-20 DIAGNOSIS — F41.9 ANXIETY AND DEPRESSION: ICD-10-CM

## 2024-09-20 DIAGNOSIS — R73.9 HYPERGLYCEMIA: ICD-10-CM

## 2024-09-20 DIAGNOSIS — J44.9 CHRONIC OBSTRUCTIVE PULMONARY DISEASE, UNSPECIFIED COPD TYPE (MULTI): ICD-10-CM

## 2024-09-20 DIAGNOSIS — R93.1 ABNORMAL HEART SCORE CT: ICD-10-CM

## 2024-09-20 DIAGNOSIS — J45.20 MILD INTERMITTENT ASTHMA WITHOUT COMPLICATION (HHS-HCC): ICD-10-CM

## 2024-09-20 DIAGNOSIS — I10 PRIMARY HYPERTENSION: ICD-10-CM

## 2024-09-20 DIAGNOSIS — R91.8 MULTIPLE LUNG NODULES ON CT: ICD-10-CM

## 2024-09-20 DIAGNOSIS — R94.30 ABNORMAL CARDIOVASCULAR FUNCTION STUDY: Primary | ICD-10-CM

## 2024-09-20 DIAGNOSIS — Z87.891 FORMER SMOKER: ICD-10-CM

## 2024-09-20 PROCEDURE — 3077F SYST BP >= 140 MM HG: CPT | Performed by: INTERNAL MEDICINE

## 2024-09-20 PROCEDURE — 3079F DIAST BP 80-89 MM HG: CPT | Performed by: INTERNAL MEDICINE

## 2024-09-20 PROCEDURE — 3008F BODY MASS INDEX DOCD: CPT | Performed by: INTERNAL MEDICINE

## 2024-09-20 PROCEDURE — 99214 OFFICE O/P EST MOD 30 MIN: CPT | Performed by: INTERNAL MEDICINE

## 2024-09-20 RX ORDER — METOPROLOL SUCCINATE 50 MG/1
50 TABLET, EXTENDED RELEASE ORAL DAILY
Qty: 90 TABLET | Refills: 3 | Status: SHIPPED | OUTPATIENT
Start: 2024-09-20 | End: 2025-09-20

## 2024-09-20 NOTE — PATIENT INSTRUCTIONS
START METOPROLOL 50MG TAKE HALF TAB FOR 7 DAYS THEN MAY INCREASE TO ONE TAB DAILY    Please bring any lab results from other providers / physicians to your next appointment.     Please bring all medicines, vitamins, and herbal supplements with you when you come to the office.     Prescriptions will not be filled unless you are compliant with your follow up appointments or have a follow up appointment scheduled as per instruction of your physician. Refills should be requested at the time of your visit.      DID YOU KNOW:  We have a pharmacy here in the [facility].  They can fill all prescriptions, not just cardiac medications.  Prescriptions from other pharmacies can easily be transferred to the  pharmacy by the  pharmacist on site.   pharmacies offer FREE HOME DELIVERY on medications to anywhere in [place]. They can sync your medications. Typically prescriptions can be ready in 10 - 15 minutes. If pharmacy is unable to fill your  prescription or if cost is more than your paying now the Pharmacist can easily transfer back to your Pharmacy of choice. Pharmacy phone # [phone].     Scribe Attestation  By signing my name below, IIldefonso LPN , Erna   attest that this documentation has been prepared under the direction and in the presence of Amanuel Roldan MD.

## 2024-09-20 NOTE — PROGRESS NOTES
CARDIOLOGY OFFICE NOTE     Date:   9/20/2024    Patient:    Héctor Fong    YOB: 1975    Primary Physician: Gricelda Vieyra MD       Reason for Visit: 3-month cardiology follow-up.    HPI:     Héctor Fong was seen in cardiac evaluation at the  Cardiology office September 20, 2024.      The patients problems are listed as in the impression below.    Electronic medical records reviewed.    Patient returns.  He states he is doing better.  He is drinking less.  He has no significant complaints.  He is trying to exercise and follow a better diet.    He is tolerating his medications well.    Exercise stress testing as noted below was negative but blood pressure systolic rise to over 200.  Echocardiogram was with mild left ventricular hypertrophy.  Left ventricular ejection fraction was 60 to 65%.    He has no new complaints.    Patient denies Chest Pain, SOB, Lightheadedness, Dizziness, TIA or CVA symptoms.  No CHF or Edema.  No Palpitations.  No GI,  or Bleeding Issues. No Recent Fever or Chills.     Cardiovascular and general review of systems is otherwise negative.    A 14-system review is otherwise negative, other than noted.     PHYSICAL EXAMINATION:      Vitals:    09/20/24 1119   BP: 142/82   Pulse: 72     General: No acute distress. Vital signs as noted. Alert and oriented.  Head And Neck Examination: No jugular venous distention, no carotid bruits, no mass. Carotid upstrokes preserved. Oral mucosa moist.  No xanthelasma. Head and neck examination otherwise unremarkable.  Lungs: Clear to auscultation and percussion. No wheezes, no rales,  and no rhonchi.  Chest: Excursion appeared to be normal. No chest wall tenderness on palpation.  Heart: Normal S1 and S2. No S3. No S4. No rub. Grade 1/6 systolic murmur, best heard at the left sternal border. Point of maximal impulse was within normal limits.  Abdomen: Soft. Nontender. No organomegaly. No bruits. No masses. Obese.  Extremities: No  bipedal edema. No clubbing. No cyanosis.  Pulses are strong throughout. No bruits.  Musculoskeletal Exam: No ulcers, otherwise unremarkable.  Neuro: Neurologically appeared grossly intact.  .  IMPRESSION:      Cardiovascular status stable  Asymptomatic  Hypertension  Coronary artery disease with positive CT calcium score 11/2023, 18, 73% Tracy.  Negative exercise treadmill stress test for significant coronary artery disease 9/2024 except for hypertension with stress.  Normal LV systolic function, LVEF 60 to 65%, echocardiogram, 9/2024  Left ventricular hypertrophy, mild  No significant valvular heart disease, echocardiogram, 9/2024.  Abnormal rest electrocardiogram  Hyperlipidemia  Hyperglycemia  Asthma  COPD  Anxiety  Depression   Bipolar disorder / depression history  Of pulmonary nodules by CT  Past smoker  Alcohol excess  Otherwise as per assessment below.    RECOMMENDATIONS:      Patient continues to do well overall.  Would suggest continuing current medications with the exception of adding Toprol-XL 50 mg daily.  Refills were provided.    Exercise dietary program was encouraged.  Hydration.    Alcohol and decrease consumption was encouraged.    [employer] portal use was encouraged.    We will plan to see back in 2 months with Laboratory Studies and ECG as ordered.     Patient will follow up with their primary physician for general care.    The patient knows to contact medical care earlier if need be.      ALLERGIES:     Allergies   Allergen Reactions    Bee Venom Protein (Honey Bee) Unknown        MEDICATIONS:     Current Outpatient Medications   Medication Instructions    amLODIPine-benazepriL (LotreL) 5-10 mg capsule 1 capsule, oral, Daily    escitalopram (LEXAPRO) 20 mg, oral, Daily       ELECTROCARDIOGRAM:      None this visit    CARDIAC TESTING:      Exercise cardiac treadmill stress test, 9/2024:  Negative except for hypertension with exercise.    Echocardiogram, 9/2024:  Normal LV systolic function.  LVEF  60 to 65%.  Left ventricular hypertrophy, mild  No significant valvular heart disease  Normal chamber sizes    LABORATORY DATA:      CBC:   Lab Results   Component Value Date    WBC 4.6 11/21/2023    RBC 4.96 11/21/2023    HGB 16.4 11/21/2023    HCT 45.1 11/21/2023     11/21/2023        CMP:    Lab Results   Component Value Date     11/21/2023    K 4.2 11/21/2023     11/21/2023    CO2 29 11/21/2023    BUN 15 11/21/2023    CREATININE 0.88 11/21/2023    GLUCOSE 105 (H) 11/21/2023    CALCIUM 9.5 11/21/2023               PROBLEM LIST:     Patient Active Problem List   Diagnosis    Anxiety and depression    Bipolar 1 disorder with moderate paddy (Multi)    Hyperglycemia    Hypertension    Mild intermittent asthma without complication (HHS-HCC)    Multiple lung nodules on CT    Chronic obstructive pulmonary disease, unspecified COPD type (Multi)    Hypertriglyceridemia    Abnormal Heart Score CT    Excessive drinking alcohol    Former smoker             Amanuel Roldan MD, [facility] / [facility] /  Cardiology      Of Note:  [employer] voice recognition dictation software was utilized partially in the preparation of this note, therefore, inaccuracies in spelling, word choice and punctuation may have occurred which were not recognized the time of signing.    Patient was seen and examined with total time of visit including chart preparation, rooming, and chart completion exceeding 40 minutes.      ----

## 2024-10-21 ENCOUNTER — APPOINTMENT (OUTPATIENT)
Dept: PRIMARY CARE | Facility: CLINIC | Age: 49
End: 2024-10-21
Payer: COMMERCIAL

## 2024-11-12 ENCOUNTER — APPOINTMENT (OUTPATIENT)
Dept: CARDIOLOGY | Facility: CLINIC | Age: 49
End: 2024-11-12
Payer: COMMERCIAL

## 2024-11-12 VITALS
HEART RATE: 76 BPM | SYSTOLIC BLOOD PRESSURE: 122 MMHG | DIASTOLIC BLOOD PRESSURE: 70 MMHG | BODY MASS INDEX: 28.11 KG/M2 | WEIGHT: 176.8 LBS

## 2024-11-12 DIAGNOSIS — F41.9 ANXIETY AND DEPRESSION: ICD-10-CM

## 2024-11-12 DIAGNOSIS — F31.12 BIPOLAR 1 DISORDER WITH MODERATE MANIA (MULTI): ICD-10-CM

## 2024-11-12 DIAGNOSIS — I10 PRIMARY HYPERTENSION: ICD-10-CM

## 2024-11-12 DIAGNOSIS — E78.1 HYPERTRIGLYCERIDEMIA: ICD-10-CM

## 2024-11-12 DIAGNOSIS — J45.20 MILD INTERMITTENT ASTHMA WITHOUT COMPLICATION (HHS-HCC): ICD-10-CM

## 2024-11-12 DIAGNOSIS — R94.30 ABNORMAL CARDIOVASCULAR FUNCTION STUDY: ICD-10-CM

## 2024-11-12 DIAGNOSIS — J44.9 CHRONIC OBSTRUCTIVE PULMONARY DISEASE, UNSPECIFIED COPD TYPE (MULTI): ICD-10-CM

## 2024-11-12 DIAGNOSIS — R73.9 HYPERGLYCEMIA: ICD-10-CM

## 2024-11-12 DIAGNOSIS — I10 HYPERTENSION, UNSPECIFIED TYPE: ICD-10-CM

## 2024-11-12 DIAGNOSIS — F32.A ANXIETY AND DEPRESSION: ICD-10-CM

## 2024-11-12 DIAGNOSIS — Z87.891 FORMER SMOKER: ICD-10-CM

## 2024-11-12 DIAGNOSIS — F10.10 EXCESSIVE DRINKING ALCOHOL: ICD-10-CM

## 2024-11-12 DIAGNOSIS — R93.1 ABNORMAL HEART SCORE CT: ICD-10-CM

## 2024-11-12 DIAGNOSIS — R91.8 MULTIPLE LUNG NODULES ON CT: ICD-10-CM

## 2024-11-12 PROCEDURE — 3078F DIAST BP <80 MM HG: CPT | Performed by: INTERNAL MEDICINE

## 2024-11-12 PROCEDURE — 99213 OFFICE O/P EST LOW 20 MIN: CPT | Performed by: INTERNAL MEDICINE

## 2024-11-12 PROCEDURE — 3074F SYST BP LT 130 MM HG: CPT | Performed by: INTERNAL MEDICINE

## 2024-11-12 RX ORDER — AMLODIPINE AND BENAZEPRIL HYDROCHLORIDE 5; 10 MG/1; MG/1
1 CAPSULE ORAL DAILY
Qty: 90 CAPSULE | Refills: 3 | Status: SHIPPED | OUTPATIENT
Start: 2024-11-12 | End: 2025-11-12

## 2024-11-12 NOTE — PATIENT INSTRUCTIONS
Continue same medications and treatments.     Please bring any lab results from other providers / physicians to your next appointment.     Please bring all medicines, vitamins, and herbal supplements with you when you come to the office.     Prescriptions will not be filled unless you are compliant with your follow up appointments or have a follow up appointment scheduled as per instruction of your physician. Refills should be requested at the time of your visit.      DID YOU KNOW:  We have a pharmacy here in the [facility].  They can fill all prescriptions, not just cardiac medications.  Prescriptions from other pharmacies can easily be transferred to the  pharmacy by the  pharmacist on site.   pharmacies offer FREE HOME DELIVERY on medications to anywhere in [place]. They can sync your medications. Typically prescriptions can be ready in 10 - 15 minutes. If pharmacy is unable to fill your  prescription or if cost is more than your paying now the Pharmacist can easily transfer back to your Pharmacy of choice. Pharmacy phone # [phone].     Scribe Attestation  By signing my name below, IIldefonso LPN , Erna   attest that this documentation has been prepared under the direction and in the presence of Amanuel Roldan MD.

## 2024-11-12 NOTE — PROGRESS NOTES
CARDIOLOGY OFFICE NOTE     Date:   11/12/2024    Patient:    Héctor Fong    YOB: 1975    Primary Physician: Gricelda Vieyra MD       Reason for Visit: Cardiology follow-up.    HPI:     Héctor Fong was seen in cardiac evaluation at the  Cardiology office November 12, 2024.      The patients problems are listed as in the impression below.    Electronic medical records reviewed.    Patient returns.  States that he feels better.  He is relatively active without limitations.  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    He ran a half marathon recently without symptoms.    Patient denies Chest Pain, SOB, Lightheadedness, Dizziness, TIA or CVA symptoms.  No CHF or Edema.  No Palpitations.  No GI,  or Bleeding Issues. No Recent Fever or Chills.     Cardiovascular and general review of systems is otherwise negative.    A 14-system review is otherwise negative, other than noted.     PHYSICAL EXAMINATION:      Vitals:    11/12/24 0913   BP: 122/70   Pulse: 76     General: No acute distress.  Alert and oriented.  Head And Neck Examination: No jugular venous distention, no carotid bruits, no mass. Carotid upstrokes preserved. Oral mucosa moist.  No xanthelasma. Head and neck examination otherwise unremarkable.  Lungs: Clear to auscultation and percussion. No wheezes, no rales,  and no rhonchi.  Chest: Excursion appeared to be normal. No chest wall tenderness on palpation.  Heart: Normal S1 and S2. No S3. No S4. No rub. Grade 1/6 systolic murmur, best heard at the left sternal border. Point of maximal impulse was within normal limits.  Abdomen: Soft. Nontender. No organomegaly. No bruits. No masses. Obese.  Extremities: No bipedal edema. No clubbing. No cyanosis.  Pulses are strong throughout. No bruits.  Musculoskeletal Exam: No ulcers, otherwise unremarkable.  Neuro: Neurologically appeared grossly intact.  .  IMPRESSION:       Cardiovascular status stable  Asymptomatic  Hypertension  Coronary  artery disease with positive CT calcium score 11/2023, 18, 73% Tracy.  Negative exercise treadmill stress test for significant coronary artery disease 9/2024 except for hypertension with stress.  Normal LV systolic function, LVEF 60 to 65%, echocardiogram, 9/2024  Left ventricular hypertrophy, mild  No significant valvular heart disease, echocardiogram, 9/2024.  Abnormal rest electrocardiogram  Hyperlipidemia  Hyperglycemia  Asthma  COPD  Anxiety  Depression   Bipolar disorder / depression history  Of pulmonary nodules by CT  Past smoker  Alcohol excess  Otherwise as per assessment below.    RECOMMENDATIONS:      Patient continues to do well overall.  Would suggest that he continue his current medications.  Refills were provided.    Exercise dietary program.    Hydration.    [employer] portal use was encouraged.    We will plan to see back in 6 months with Laboratory Studies and ECG as ordered.     Patient will follow up with their primary physician for general care.    The patient knows to contact medical care earlier if need be.      ALLERGIES:     Allergies   Allergen Reactions    Bee Venom Protein (Honey Bee) Unknown        MEDICATIONS:     Current Outpatient Medications   Medication Instructions    amLODIPine-benazepriL (LotreL) 5-10 mg capsule 1 capsule, oral, Daily    metoprolol succinate XL (TOPROL XL) 50 mg, oral, Daily       ELECTROCARDIOGRAM:      None this visit    CARDIAC TESTING:      None this visit    LABORATORY DATA:      None this visit.    Recent as noted below.    CBC:   Lab Results   Component Value Date    WBC 4.6 11/21/2023    RBC 4.96 11/21/2023    HGB 16.4 11/21/2023    HCT 45.1 11/21/2023     11/21/2023        CMP:    Lab Results   Component Value Date     11/21/2023    K 4.2 11/21/2023     11/21/2023    CO2 29 11/21/2023    BUN 15 11/21/2023    CREATININE 0.88 11/21/2023    GLUCOSE 105 (H) 11/21/2023    CALCIUM 9.5 11/21/2023     Lipid Profile:    Lab Results   Component  Value Date    CHOL 195 11/21/2023    TRIG 301 (H) 11/21/2023    HDL 67.6 11/21/2023    LDLF 91 10/12/2022       Hepatic Function Panel:    Lab Results   Component Value Date    ALKPHOS 74 11/21/2023    ALT 26 11/21/2023    AST 24 11/21/2023    PROT 7.3 11/21/2023    BILITOT 1.1 11/21/2023       TSH:    Lab Results   Component Value Date    TSH 0.99 11/21/2023       HgBA1c:    Lab Results   Component Value Date    HGBA1C 4.2 11/21/2023                   PROBLEM LIST:     Patient Active Problem List   Diagnosis    Anxiety and depression    Bipolar 1 disorder with moderate paddy (Multi)    Hyperglycemia    Hypertension    Mild intermittent asthma without complication (HHS-HCC)    Multiple lung nodules on CT    Chronic obstructive pulmonary disease, unspecified COPD type (Multi)    Hypertriglyceridemia    Abnormal Heart Score CT    Excessive drinking alcohol    Former smoker    Abnormal cardiovascular function study             Amanuel Roldan MD, [facility] / [facility] /  Cardiology      Of Note:  [employer] voice recognition dictation software was utilized partially in the preparation of this note, therefore, inaccuracies in spelling, word choice and punctuation may have occurred which were not recognized the time of signing.    Patient was seen and examined with total time of visit including chart preparation, rooming, and chart completion exceeding 40 minutes.      ----

## 2025-05-05 ENCOUNTER — APPOINTMENT (OUTPATIENT)
Dept: RADIOLOGY | Facility: HOSPITAL | Age: 50
End: 2025-05-05
Payer: COMMERCIAL

## 2025-05-07 ENCOUNTER — APPOINTMENT (OUTPATIENT)
Dept: RADIOLOGY | Facility: HOSPITAL | Age: 50
End: 2025-05-07
Payer: COMMERCIAL

## 2025-05-07 DIAGNOSIS — R91.1 SOLITARY PULMONARY NODULE: ICD-10-CM

## 2025-05-07 PROCEDURE — 71250 CT THORAX DX C-: CPT

## 2025-05-07 PROCEDURE — 71250 CT THORAX DX C-: CPT | Performed by: RADIOLOGY

## 2025-05-13 ENCOUNTER — APPOINTMENT (OUTPATIENT)
Dept: CARDIOLOGY | Facility: CLINIC | Age: 50
End: 2025-05-13
Payer: COMMERCIAL

## 2025-05-16 LAB
ALBUMIN SERPL-MCNC: 4.4 G/DL (ref 3.6–5.1)
ALBUMIN/GLOB SERPL: 1.7 (CALC) (ref 1–2.5)
ALP SERPL-CCNC: 62 U/L (ref 35–144)
ALT SERPL-CCNC: 20 U/L (ref 9–46)
ANION GAP SERPL CALCULATED.4IONS-SCNC: 13 MMOL/L (CALC) (ref 7–17)
AST SERPL-CCNC: 20 U/L (ref 10–35)
BILIRUB DIRECT SERPL-MCNC: 0.2 MG/DL
BILIRUB INDIRECT SERPL-MCNC: 0.5 MG/DL (CALC) (ref 0.2–1.2)
BILIRUB SERPL-MCNC: 0.7 MG/DL (ref 0.2–1.2)
BUN SERPL-MCNC: 14 MG/DL (ref 7–25)
BUN/CREAT SERPL: NORMAL (CALC) (ref 6–22)
CALCIUM SERPL-MCNC: 9.4 MG/DL (ref 8.6–10.3)
CHLORIDE SERPL-SCNC: 106 MMOL/L (ref 98–110)
CHOLEST SERPL-MCNC: 184 MG/DL
CHOLEST/HDLC SERPL: 2.5 (CALC)
CO2 SERPL-SCNC: 22 MMOL/L (ref 20–32)
CREAT SERPL-MCNC: 0.78 MG/DL (ref 0.7–1.3)
EGFRCR SERPLBLD CKD-EPI 2021: 108 ML/MIN/1.73M2
ERYTHROCYTE [DISTWIDTH] IN BLOOD BY AUTOMATED COUNT: 11.9 % (ref 11–15)
GLOBULIN SER CALC-MCNC: 2.6 G/DL (CALC) (ref 1.9–3.7)
GLUCOSE SERPL-MCNC: 85 MG/DL (ref 65–99)
HCT VFR BLD AUTO: 45 % (ref 38.5–50)
HDLC SERPL-MCNC: 73 MG/DL
HGB BLD-MCNC: 15.3 G/DL (ref 13.2–17.1)
LDLC SERPL CALC-MCNC: 84 MG/DL (CALC)
MAGNESIUM SERPL-MCNC: 1.9 MG/DL (ref 1.5–2.5)
MCH RBC QN AUTO: 32.6 PG (ref 27–33)
MCHC RBC AUTO-ENTMCNC: 34 G/DL (ref 32–36)
MCV RBC AUTO: 95.9 FL (ref 80–100)
NONHDLC SERPL-MCNC: 111 MG/DL (CALC)
PLATELET # BLD AUTO: 174 THOUSAND/UL (ref 140–400)
PMV BLD REES-ECKER: 10.3 FL (ref 7.5–12.5)
POTASSIUM SERPL-SCNC: 4.4 MMOL/L (ref 3.5–5.3)
PROT SERPL-MCNC: 7 G/DL (ref 6.1–8.1)
RBC # BLD AUTO: 4.69 MILLION/UL (ref 4.2–5.8)
SODIUM SERPL-SCNC: 141 MMOL/L (ref 135–146)
TRIGL SERPL-MCNC: 167 MG/DL
TSH SERPL-ACNC: 0.82 MIU/L (ref 0.4–4.5)
WBC # BLD AUTO: 4.3 THOUSAND/UL (ref 3.8–10.8)

## 2025-05-20 ENCOUNTER — APPOINTMENT (OUTPATIENT)
Dept: CARDIOLOGY | Facility: CLINIC | Age: 50
End: 2025-05-20
Payer: COMMERCIAL

## 2025-05-20 VITALS
DIASTOLIC BLOOD PRESSURE: 62 MMHG | HEIGHT: 66 IN | WEIGHT: 168.8 LBS | HEART RATE: 58 BPM | SYSTOLIC BLOOD PRESSURE: 104 MMHG | BODY MASS INDEX: 27.13 KG/M2

## 2025-05-20 DIAGNOSIS — I10 PRIMARY HYPERTENSION: ICD-10-CM

## 2025-05-20 DIAGNOSIS — Z87.891 FORMER SMOKER: ICD-10-CM

## 2025-05-20 DIAGNOSIS — R91.8 MULTIPLE LUNG NODULES ON CT: ICD-10-CM

## 2025-05-20 DIAGNOSIS — R93.1 ABNORMAL HEART SCORE CT: ICD-10-CM

## 2025-05-20 DIAGNOSIS — J45.20 MILD INTERMITTENT ASTHMA WITHOUT COMPLICATION (HHS-HCC): ICD-10-CM

## 2025-05-20 DIAGNOSIS — F41.9 ANXIETY AND DEPRESSION: ICD-10-CM

## 2025-05-20 DIAGNOSIS — J44.9 CHRONIC OBSTRUCTIVE PULMONARY DISEASE, UNSPECIFIED COPD TYPE (MULTI): ICD-10-CM

## 2025-05-20 DIAGNOSIS — F32.A ANXIETY AND DEPRESSION: ICD-10-CM

## 2025-05-20 DIAGNOSIS — R73.9 HYPERGLYCEMIA: ICD-10-CM

## 2025-05-20 DIAGNOSIS — E78.1 HYPERTRIGLYCERIDEMIA: ICD-10-CM

## 2025-05-20 DIAGNOSIS — R94.30 ABNORMAL CARDIOVASCULAR FUNCTION STUDY: ICD-10-CM

## 2025-05-20 DIAGNOSIS — F31.12 BIPOLAR 1 DISORDER WITH MODERATE MANIA (MULTI): ICD-10-CM

## 2025-05-20 DIAGNOSIS — F10.10 EXCESSIVE DRINKING ALCOHOL: ICD-10-CM

## 2025-05-20 PROCEDURE — 99213 OFFICE O/P EST LOW 20 MIN: CPT | Performed by: INTERNAL MEDICINE

## 2025-05-20 PROCEDURE — 3008F BODY MASS INDEX DOCD: CPT | Performed by: INTERNAL MEDICINE

## 2025-05-20 PROCEDURE — 3074F SYST BP LT 130 MM HG: CPT | Performed by: INTERNAL MEDICINE

## 2025-05-20 PROCEDURE — 3078F DIAST BP <80 MM HG: CPT | Performed by: INTERNAL MEDICINE

## 2025-05-20 NOTE — PATIENT INSTRUCTIONS
Follow up 1 year  Fasting labs to be done approximately 1 week prior to next appointment. We are a paperless office.  The order is in the computer and you can go to any  lab to have done. You can always ask for order to be printed if you'd like to go to outside lab.      DID YOU KNOW  We have a pharmacy here in the [facility].  They can fill all prescriptions, not just cardiac medications.  Prescriptions from other pharmacies can easily be transferred to the  pharmacy by the  pharmacist on site.   pharmacies offer FREE HOME DELIVERY on medications to anywhere in [place]. They can sync your medications. Typically prescriptions can be ready in 10 - 15 minutes. If pharmacy is unable to fill your  prescription or if cost is more than your paying now the Pharmacist can easily transfer back to your Pharmacy of choice. Pharmacy phone # [phone].     Please bring all medicines, vitamins, and herbal supplements with you in original bottles to every appointment!!!!    Prescriptions will not be filled unless you are compliant with your follow up appointments or have a follow up appointment scheduled as per instruction of your physician. Refills should be requested at the time of your visit.

## 2025-05-20 NOTE — PROGRESS NOTES
CARDIOLOGY OFFICE NOTE     Date:   5/20/2025    Patient:    Héctor Fong    YOB: 1975    Primary Physician: Gricelda Vieyra MD         REASON FOR VISIT / CHIEF COMPLAINT:     6-month cardiology follow-up.    HPI:     Héctor Fong was seen in cardiac evaluation at the [facility] Cardiology office May 20, 2025.      The patients problems are listed as in the impression below.    Electronic medical records reviewed.    Returns.  6-month follow-up.  He feels well.  He is active.  He is working doing [employer].  He ran a half marathon in [place] this last weekend.  He had no cardiovascular limitations.    He has no symptoms overall.    Patient denies Chest Pain, SOB, Lightheadedness, Dizziness, TIA or CVA symptoms.  No CHF or Edema.  No Palpitations.  No GI,  or Bleeding Issues. No Recent Fever or Chills.     Cardiovascular and general review of systems is otherwise negative.    A 14-system review is otherwise negative, other than noted.     PHYSICAL EXAMINATION:      Vitals:    05/20/25 1122   BP: 104/62   Pulse: 58     General: No acute distress.  Alert and oriented.  Head And Neck Examination: No jugular venous distention, no carotid bruits, no mass. Carotid upstrokes preserved. Oral mucosa moist.  No xanthelasma. Head and neck examination otherwise unremarkable.  Lungs: Clear to auscultation and percussion. No wheezes, no rales,  and no rhonchi.  Chest: Excursion appeared to be normal. No chest wall tenderness on palpation.  Heart: Normal S1 and S2. No S3. No S4. No rub. Grade 1/6 systolic murmur, best heard at the left sternal border. Point of maximal impulse was within normal limits.  Abdomen: Soft. Nontender. No organomegaly. No bruits. No masses.   Extremities: No bipedal edema. No clubbing. No cyanosis.  Pulses are strong throughout. No bruits.  Musculoskeletal Exam: No ulcers, otherwise unremarkable.  Neuro: Neurologically appeared grossly intact.  .  IMPRESSION:        Cardiovascular status stable  Asymptomatic  Hypertension  Coronary artery disease with positive CT calcium score 11/2023, 18, 73% Tracy.  Negative exercise treadmill stress test for significant coronary artery disease 9/2024 except for hypertension with stress.  Normal LV systolic function, LVEF 60 to 65%, echocardiogram, 9/2024  Left ventricular hypertrophy, mild  No significant valvular heart disease, echocardiogram, 9/2024.  Abnormal rest electrocardiogram  Hyperlipidemia  Hyperglycemia  Asthma  COPD  Anxiety  Depression   Bipolar disorder / depression history  Of pulmonary nodules by CT  Past smoker  Alcohol excess  Otherwise as per assessment below.    RECOMMENDATIONS:      Patient was reassured overall.  His laboratory studies are good.  His LDL is 84.  Would suggest that he continue his current medications.  Diet and exercise for cholesterol management.  Refills were provided.    [employer] portal use was encouraged.    We will plan to see back in 1 year with Laboratory Studies and ECG as ordered.     Patient will follow up with their primary physician for general care.    The patient knows to contact medical care earlier if need be.      ALLERGIES:     Bee venom protein (honey bee)     MEDICATIONS:     Current Outpatient Medications   Medication Instructions    amLODIPine-benazepriL (LotreL) 5-10 mg capsule 1 capsule, oral, Daily    metoprolol succinate XL (TOPROL XL) 50 mg, oral, Daily       ELECTROCARDIOGRAM:      None this visit    CARDIAC TESTING:      None this visit    LABORATORY DATA:      CBC:   Lab Results   Component Value Date    WBC 4.3 05/15/2025    RBC 4.69 05/15/2025    HGB 15.3 05/15/2025    HCT 45.0 05/15/2025     05/15/2025        CMP:    Lab Results   Component Value Date     05/15/2025    K 4.4 05/15/2025     05/15/2025    CO2 22 05/15/2025    BUN 14 05/15/2025    CREATININE 0.78 05/15/2025    GLUCOSE 85 05/15/2025    CALCIUM 9.4 05/15/2025       Magnesium:    Lab Results    Component Value Date    MG 1.9 05/15/2025       Lipid Profile:    Lab Results   Component Value Date    CHOL 184 05/15/2025    TRIG 167 (H) 05/15/2025    HDL 73 05/15/2025    LDLCALC 84 05/15/2025       Hepatic Function Panel:    Lab Results   Component Value Date    ALKPHOS 62 05/15/2025    ALT 20 05/15/2025    AST 20 05/15/2025    PROT 7.0 05/15/2025    BILITOT 0.7 05/15/2025    BILIDIR 0.2 05/15/2025       TSH:    Lab Results   Component Value Date    TSH 0.82 05/15/2025                   PROBLEM LIST:     Problem List[1]          Amanuel Roldan MD, FACC   [facility] /  Cardiology      Of Note:  [email or web address] voice recognition dictation software was utilized partially in the preparation of this note, therefore, inaccuracies in spelling, word choice and punctuation may have occurred which were not recognized at the time of signing.    Patient was seen and examined with total time of visit including chart preparation, rooming, and chart completion exceeding 40 minutes.      Scribe Attestation  By signing my name below, I, Mirna Graham LPN , Scribe attest that this documentation has been prepared under the direction and in the presence of Amanuel Roldan MD,FACC.    I, Dr. Amanuel Roldan MD, FACC, personally performed the services described in the documentation as scribed by Mirna Graham LPN  in my presence, and confirm it is both accurate and complete.              [1]   Patient Active Problem List  Diagnosis    Anxiety and depression    Bipolar 1 disorder with moderate paddy (Multi)    Hyperglycemia    Hypertension    Mild intermittent asthma without complication (HHS-HCC)    Multiple lung nodules on CT    Chronic obstructive pulmonary disease, unspecified COPD type (Multi)    Hypertriglyceridemia    Abnormal Heart Score CT    Excessive drinking alcohol    Former smoker    Abnormal cardiovascular function study

## 2025-08-21 ENCOUNTER — HOSPITAL ENCOUNTER (OUTPATIENT)
Dept: RADIOLOGY | Facility: HOSPITAL | Age: 50
Discharge: HOME | End: 2025-08-21
Payer: COMMERCIAL

## 2025-08-21 ENCOUNTER — OFFICE VISIT (OUTPATIENT)
Dept: SURGERY | Facility: CLINIC | Age: 50
End: 2025-08-21
Payer: COMMERCIAL

## 2025-08-21 VITALS — BODY MASS INDEX: 27.6 KG/M2 | DIASTOLIC BLOOD PRESSURE: 80 MMHG | SYSTOLIC BLOOD PRESSURE: 128 MMHG | WEIGHT: 171 LBS

## 2025-08-21 DIAGNOSIS — R07.9 CHEST PAIN, UNSPECIFIED TYPE: ICD-10-CM

## 2025-08-21 DIAGNOSIS — R07.9 CHEST PAIN, UNSPECIFIED TYPE: Primary | ICD-10-CM

## 2025-08-21 PROCEDURE — 71046 X-RAY EXAM CHEST 2 VIEWS: CPT

## 2025-08-21 PROCEDURE — 71046 X-RAY EXAM CHEST 2 VIEWS: CPT | Performed by: RADIOLOGY

## 2025-08-21 PROCEDURE — 3079F DIAST BP 80-89 MM HG: CPT | Performed by: SURGERY

## 2025-08-21 PROCEDURE — 99213 OFFICE O/P EST LOW 20 MIN: CPT | Performed by: SURGERY

## 2025-08-21 PROCEDURE — 3074F SYST BP LT 130 MM HG: CPT | Performed by: SURGERY

## 2025-08-21 RX ORDER — AZITHROMYCIN 500 MG/1
500 TABLET, FILM COATED ORAL DAILY
Qty: 3 TABLET | Refills: 0 | Status: SHIPPED | OUTPATIENT
Start: 2025-08-21 | End: 2025-08-24

## 2025-08-21 ASSESSMENT — ENCOUNTER SYMPTOMS
STRIDOR: 0
WHEEZING: 0
CHOKING: 0
COUGH: 1
APNEA: 0
SHORTNESS OF BREATH: 0
CHEST TIGHTNESS: 0

## 2026-05-20 ENCOUNTER — APPOINTMENT (OUTPATIENT)
Dept: CARDIOLOGY | Facility: CLINIC | Age: 51
End: 2026-05-20
Payer: COMMERCIAL